# Patient Record
Sex: MALE | Race: BLACK OR AFRICAN AMERICAN | Employment: UNEMPLOYED | URBAN - METROPOLITAN AREA
[De-identification: names, ages, dates, MRNs, and addresses within clinical notes are randomized per-mention and may not be internally consistent; named-entity substitution may affect disease eponyms.]

---

## 2018-09-10 ENCOUNTER — APPOINTMENT (OUTPATIENT)
Dept: CT IMAGING | Age: 48
End: 2018-09-10
Attending: PHYSICIAN ASSISTANT
Payer: SELF-PAY

## 2018-09-10 ENCOUNTER — HOSPITAL ENCOUNTER (EMERGENCY)
Age: 48
Discharge: HOME | End: 2018-09-10
Attending: EMERGENCY MEDICINE
Payer: SELF-PAY

## 2018-09-10 VITALS
RESPIRATION RATE: 18 BRPM | SYSTOLIC BLOOD PRESSURE: 123 MMHG | HEIGHT: 74 IN | TEMPERATURE: 98.5 F | HEART RATE: 76 BPM | DIASTOLIC BLOOD PRESSURE: 79 MMHG | OXYGEN SATURATION: 98 % | BODY MASS INDEX: 27.59 KG/M2 | WEIGHT: 215 LBS

## 2018-09-10 DIAGNOSIS — M54.31 SCIATICA, RIGHT SIDE: Primary | ICD-10-CM
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
## 2018-09-10 PROCEDURE — 74011250636 HC RX REV CODE- 250/636: Performed by: PHYSICIAN ASSISTANT

## 2018-09-10 PROCEDURE — 99283 EMERGENCY DEPT VISIT LOW MDM: CPT

## 2018-09-10 PROCEDURE — 74011250637 HC RX REV CODE- 250/637: Performed by: PHYSICIAN ASSISTANT

## 2018-09-10 PROCEDURE — 96372 THER/PROPH/DIAG INJ SC/IM: CPT

## 2018-09-10 PROCEDURE — 72131 CT LUMBAR SPINE W/O DYE: CPT

## 2018-09-10 PROCEDURE — 74011636637 HC RX REV CODE- 636/637: Performed by: PHYSICIAN ASSISTANT

## 2018-09-10 RX ORDER — TIZANIDINE 4 MG/1
TABLET ORAL
Qty: 15 TAB | Refills: 0 | Status: SHIPPED | OUTPATIENT
Start: 2018-09-10 | End: 2019-04-30

## 2018-09-10 RX ORDER — KETOROLAC TROMETHAMINE 10 MG/1
10 TABLET, FILM COATED ORAL
Qty: 10 TAB | Refills: 0 | Status: SHIPPED | OUTPATIENT
Start: 2018-09-10 | End: 2019-04-30

## 2018-09-10 RX ORDER — PREDNISONE 20 MG/1
60 TABLET ORAL
Status: COMPLETED | OUTPATIENT
Start: 2018-09-10 | End: 2018-09-10

## 2018-09-10 RX ORDER — KETOROLAC TROMETHAMINE 30 MG/ML
15 INJECTION, SOLUTION INTRAMUSCULAR; INTRAVENOUS
Status: COMPLETED | OUTPATIENT
Start: 2018-09-10 | End: 2018-09-10

## 2018-09-10 RX ORDER — TIZANIDINE 4 MG/1
4 TABLET ORAL 4 TIMES DAILY
Status: DISCONTINUED | OUTPATIENT
Start: 2018-09-10 | End: 2018-09-11 | Stop reason: HOSPADM

## 2018-09-10 RX ADMIN — TIZANIDINE 4 MG: 4 TABLET ORAL at 22:07

## 2018-09-10 RX ADMIN — PREDNISONE 60 MG: 20 TABLET ORAL at 21:31

## 2018-09-10 RX ADMIN — KETOROLAC TROMETHAMINE 15 MG: 30 INJECTION INTRAMUSCULAR; INTRAVENOUS at 21:31
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
## 2018-09-11 NOTE — ED NOTES
Will Abdi at bedside reviewing patient's discharge instructions and reviewing medications. Patient ambulatory home with self. Patient in no apparent distress.
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
## 2018-10-02 ENCOUNTER — HOSPITAL ENCOUNTER (OUTPATIENT)
Dept: PHYSICAL THERAPY | Age: 48
Discharge: HOME | End: 2018-10-02
Payer: SELF-PAY

## 2018-10-02 PROCEDURE — 97110 THERAPEUTIC EXERCISES: CPT | Performed by: PHYSICAL THERAPIST

## 2018-10-02 PROCEDURE — 97161 PT EVAL LOW COMPLEX 20 MIN: CPT | Performed by: PHYSICAL THERAPIST
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
## 2018-10-12 ENCOUNTER — HOSPITAL ENCOUNTER (OUTPATIENT)
Dept: PHYSICAL THERAPY | Age: 48
Discharge: HOME | End: 2018-10-12
Payer: SELF-PAY
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
## 2018-12-04 NOTE — PROGRESS NOTES
[facility]  [address], [address] [facility]    OUTPATIENT physical Therapy discharge note      12/4/2018:  Patient will be discharged from physical therapy at this time. Criteria for termination of care:    [x]        Patient has not returned to therapy  []        Patient has missed three or more visits without prior notification  []        Other:     Patient has been seen for initial eval only on 10/2/2018. Please refer to that document for any pertinent PT info available. If you need anything further faxed to you, please contact us at [phone].     Thank you for this referral.  Vangie Fernández, PT

## 2019-04-30 ENCOUNTER — APPOINTMENT (OUTPATIENT)
Dept: CT IMAGING | Age: 49
End: 2019-04-30
Attending: EMERGENCY MEDICINE
Payer: SELF-PAY

## 2019-04-30 ENCOUNTER — HOSPITAL ENCOUNTER (EMERGENCY)
Age: 49
Discharge: HOME | End: 2019-04-30
Attending: EMERGENCY MEDICINE
Payer: SELF-PAY

## 2019-04-30 VITALS
OXYGEN SATURATION: 95 % | HEIGHT: 75 IN | DIASTOLIC BLOOD PRESSURE: 77 MMHG | HEART RATE: 69 BPM | RESPIRATION RATE: 13 BRPM | TEMPERATURE: 98.3 F | BODY MASS INDEX: 26.73 KG/M2 | SYSTOLIC BLOOD PRESSURE: 119 MMHG | WEIGHT: 215 LBS

## 2019-04-30 DIAGNOSIS — M62.838 MUSCLE SPASM: ICD-10-CM

## 2019-04-30 DIAGNOSIS — R20.2 FACIAL PARESTHESIA: Primary | ICD-10-CM

## 2019-04-30 LAB
ALBUMIN SERPL-MCNC: 3.5 G/DL (ref 3.5–5)
ALBUMIN/GLOB SERPL: 0.9 {RATIO} (ref 1.1–2.2)
ALP SERPL-CCNC: 80 U/L (ref 45–117)
ALT SERPL-CCNC: 24 U/L (ref 12–78)
ANION GAP SERPL CALC-SCNC: 6 MMOL/L (ref 5–15)
AST SERPL-CCNC: 18 U/L (ref 15–37)
BASOPHILS # BLD: 0 K/UL (ref 0–0.1)
BASOPHILS NFR BLD: 1 % (ref 0–1)
BILIRUB SERPL-MCNC: 0.5 MG/DL (ref 0.2–1)
BUN SERPL-MCNC: 11 MG/DL (ref 6–20)
BUN/CREAT SERPL: 15 (ref 12–20)
CALCIUM SERPL-MCNC: 9.4 MG/DL (ref 8.5–10.1)
CHLORIDE SERPL-SCNC: 105 MMOL/L (ref 97–108)
CO2 SERPL-SCNC: 29 MMOL/L (ref 21–32)
CREAT SERPL-MCNC: 0.73 MG/DL (ref 0.7–1.3)
DIFFERENTIAL METHOD BLD: ABNORMAL
EOSINOPHIL # BLD: 0.3 K/UL (ref 0–0.4)
EOSINOPHIL NFR BLD: 6 % (ref 0–7)
ERYTHROCYTE [DISTWIDTH] IN BLOOD BY AUTOMATED COUNT: 14.8 % (ref 11.5–14.5)
GLOBULIN SER CALC-MCNC: 3.7 G/DL (ref 2–4)
GLUCOSE SERPL-MCNC: 101 MG/DL (ref 65–100)
HCT VFR BLD AUTO: 36.8 % (ref 36.6–50.3)
HGB BLD-MCNC: 11.8 G/DL (ref 12.1–17)
IMM GRANULOCYTES # BLD AUTO: 0 K/UL (ref 0–0.04)
IMM GRANULOCYTES NFR BLD AUTO: 0 % (ref 0–0.5)
LYMPHOCYTES # BLD: 2 K/UL (ref 0.8–3.5)
LYMPHOCYTES NFR BLD: 35 % (ref 12–49)
MCH RBC QN AUTO: 24.1 PG (ref 26–34)
MCHC RBC AUTO-ENTMCNC: 32.1 G/DL (ref 30–36.5)
MCV RBC AUTO: 75.1 FL (ref 80–99)
MONOCYTES # BLD: 0.8 K/UL (ref 0–1)
MONOCYTES NFR BLD: 13 % (ref 5–13)
NEUTS SEG # BLD: 2.7 K/UL (ref 1.8–8)
NEUTS SEG NFR BLD: 45 % (ref 32–75)
NRBC # BLD: 0 K/UL (ref 0–0.01)
NRBC BLD-RTO: 0 PER 100 WBC
PLATELET # BLD AUTO: 198 K/UL (ref 150–400)
PMV BLD AUTO: 10.5 FL (ref 8.9–12.9)
POTASSIUM SERPL-SCNC: 3.8 MMOL/L (ref 3.5–5.1)
PROT SERPL-MCNC: 7.2 G/DL (ref 6.4–8.2)
RBC # BLD AUTO: 4.9 M/UL (ref 4.1–5.7)
SODIUM SERPL-SCNC: 140 MMOL/L (ref 136–145)
WBC # BLD AUTO: 5.9 K/UL (ref 4.1–11.1)

## 2019-04-30 PROCEDURE — 99283 EMERGENCY DEPT VISIT LOW MDM: CPT

## 2019-04-30 PROCEDURE — 85025 COMPLETE CBC W/AUTO DIFF WBC: CPT

## 2019-04-30 PROCEDURE — 70450 CT HEAD/BRAIN W/O DYE: CPT

## 2019-04-30 PROCEDURE — 93005 ELECTROCARDIOGRAM TRACING: CPT

## 2019-04-30 PROCEDURE — 80053 COMPREHEN METABOLIC PANEL: CPT

## 2019-04-30 PROCEDURE — 36415 COLL VENOUS BLD VENIPUNCTURE: CPT

## 2019-04-30 PROCEDURE — 74011250637 HC RX REV CODE- 250/637: Performed by: EMERGENCY MEDICINE

## 2019-04-30 RX ORDER — ASPIRIN 81 MG/1
81 TABLET ORAL DAILY
Qty: 30 TAB | Refills: 0 | Status: SHIPPED | OUTPATIENT
Start: 2019-04-30 | End: 2020-05-10

## 2019-04-30 RX ORDER — CYCLOBENZAPRINE HCL 10 MG
10 TABLET ORAL
Status: COMPLETED | OUTPATIENT
Start: 2019-04-30 | End: 2019-04-30

## 2019-04-30 RX ORDER — CYCLOBENZAPRINE HCL 10 MG
10 TABLET ORAL
Qty: 10 TAB | Refills: 0 | Status: SHIPPED | OUTPATIENT
Start: 2019-04-30 | End: 2020-05-10

## 2019-04-30 RX ORDER — GUAIFENESIN 100 MG/5ML
325 LIQUID (ML) ORAL
Status: COMPLETED | OUTPATIENT
Start: 2019-04-30 | End: 2019-04-30

## 2019-04-30 RX ADMIN — CYCLOBENZAPRINE HYDROCHLORIDE 10 MG: 10 TABLET, FILM COATED ORAL at 21:50

## 2019-04-30 RX ADMIN — CETIRIZINE HYDROCHLORIDE, PSEUDOEPHEDRINE HYDROCHLORIDE 324 MG: 5; 120 TABLET, FILM COATED, EXTENDED RELEASE ORAL at 21:51

## 2019-05-01 LAB
ATRIAL RATE: 68 BPM
CALCULATED P AXIS, ECG09: 57 DEGREES
CALCULATED R AXIS, ECG10: 45 DEGREES
CALCULATED T AXIS, ECG11: 28 DEGREES
DIAGNOSIS, 93000: NORMAL
P-R INTERVAL, ECG05: 146 MS
Q-T INTERVAL, ECG07: 380 MS
QRS DURATION, ECG06: 90 MS
QTC CALCULATION (BEZET), ECG08: 404 MS
VENTRICULAR RATE, ECG03: 68 BPM

## 2019-05-01 NOTE — ED NOTES
Pt arrived to ED  with c/o weakness above left eye for 45 minutes. Denies cp or dyspnea. Pt reports smoking aand use of heroin 5 hours. Pt is in no acute distress. Will continue to monitor. See nursing assessment. Safety precautions in place; call light within reach. Emergency Department Nursing Plan of Care The Nursing Plan of Care is developed from the Nursing assessment and Emergency Department Attending provider initial evaluation. The plan of care may be reviewed in the ED Provider note. The Plan of Care was developed with the following considerations:  
Patient / Family readiness to learn indicated by:verbalized understanding Persons(s) to be included in education: patient Barriers to Learning/Limitations:No 
 
Signed Kathie Edwards RN   
4/30/2019   9:01 PM

## 2019-05-01 NOTE — ED TRIAGE NOTES
Pt reports L eye numbness X 20 min PTA. Pt did not have an asymmetrical smile in triage. Pt also reports L leg pain from hip to calf X 1 week. Pt denies any injury.

## 2019-05-01 NOTE — ED NOTES
Patient (s)  given copy of dc instructions and 2 script(s). Patient (s)  verbalized understanding of instructions and script (s). Patient given a current medication reconciliation form and verbalized understanding of their medications. Patient (s) verbalized understanding of the importance of discussing medications with  his or her physician or clinic they will be following up with. Patient alert and oriented and in no acute distress. Patient discharged home ambulatory.

## 2019-05-01 NOTE — DISCHARGE INSTRUCTIONS
Patient Education        Muscle Cramps: Care Instructions  Your Care Instructions    A muscle cramp occurs when a muscle tightens up suddenly. A cramp often happens in the legs. A muscle cramp is also called a muscle spasm or a charley horse. Muscle cramps usually last less than a minute. However, the pain may last for several minutes. Leg cramps that occur at night may wake you up. Heavy exercise, dehydration, and being overweight can increase your risk of getting cramps. An imbalance of certain chemicals in your blood, called electrolytes, can also lead to muscle cramps. Pregnant women sometimes get muscle cramps during sleep. Muscle cramps can be treated by stretching and massaging the muscle. If cramps keep coming back, your doctor may prescribe medicine that relaxes your muscles. Follow-up care is a key part of your treatment and safety. Be sure to make and go to all appointments, and call your doctor if you are having problems. It's also a good idea to know your test results and keep a list of the medicines you take. How can you care for yourself at home? · Drink plenty of fluids to prevent dehydration. Choose water and other caffeine-free clear liquids until you feel better. If you have kidney, heart, or liver disease and have to limit fluids, talk with your doctor before you increase the amount of fluids you drink. · Stretch your muscles every day, especially before and after exercise and at bedtime. Regular stretching can relax your muscles and may prevent cramps. · Do not suddenly increase the amount of exercise you get. Increase your exercise a little each week. · When you get a cramp, stretch and massage the muscle. You can also take a warm shower or bath to relax the muscle. A heating pad placed on the muscle can also help. · Take a daily multivitamin supplement.   · Ask your doctor if you can take an over-the-counter pain medicine, such as acetaminophen (Tylenol), ibuprofen (Advil, Motrin), or naproxen (Aleve). Be safe with medicines. Read and follow all instructions on the label. When should you call for help? Watch closely for changes in your health, and be sure to contact your doctor if:    · You get muscle cramps often that do not go away after home treatment.     · Your muscle cramps often wake you up at night.     · You do not get better as expected. Where can you learn more? Go to http://andres-melany.info/. Enter D519 in the search box to learn more about \"Muscle Cramps: Care Instructions. \"  Current as of: September 20, 2018  Content Version: 11.9  © 0127-0393 [employer]. Care instructions adapted under license by [employer] (which disclaims liability or warranty for this information). If you have questions about a medical condition or this instruction, always ask your healthcare professional. [place] [address] any warranty or liability for your use of this information. Patient Education        Numbness and Tingling: Care Instructions  Your Care Instructions    Many things can cause numbness or tingling. Swelling may put pressure on a nerve. This could cause you to lose feeling or have a pins-and-needles sensation on part of your body. Nerves may be damaged from trauma, toxins, or diseases, such as diabetes or multiple sclerosis (MS). Sometimes, though, the cause is not clear. If there is no clear reason for your symptoms, and you are not having any other symptoms, your doctor may suggest watching and waiting for a while to see if the numbness or tingling goes away on its own. Your doctor may want you to have blood or nerve tests to find the cause of your symptoms. Follow-up care is a key part of your treatment and safety. Be sure to make and go to all appointments, and call your doctor if you are having problems. It's also a good idea to know your test results and keep a list of the medicines you take.   How can you care for yourself at home? · If your doctor prescribes medicine, take it exactly as directed. Call your doctor if you think you are having a problem with your medicine. · If you have any swelling, put ice or a cold pack on the area for 10 to 20 minutes at a time. Put a thin cloth between the ice and your skin. When should you call for help? Call 911 anytime you think you may need emergency care. For example, call if:    · You have weakness, numbness, or tingling in both legs.     · You lose bowel or bladder control.     · You have symptoms of a stroke. These may include:  ? Sudden numbness, tingling, weakness, or loss of movement in your face, arm, or leg, especially on only one side of your body. ? Sudden vision changes. ? Sudden trouble speaking. ? Sudden confusion or trouble understanding simple statements. ? Sudden problems with walking or balance. ? A sudden, severe headache that is different from past headaches.    Watch closely for changes in your health, and be sure to contact your doctor if you have any problems, or if:    · You do not get better as expected. Where can you learn more? Go to http://andres-melany.info/. Enter S885 in the search box to learn more about \"Numbness and Tingling: Care Instructions. \"  Current as of: Dipika 3, 2018  Content Version: 11.9  © 6499-0047 [employer]. Care instructions adapted under license by [employer] (which disclaims liability or warranty for this information). If you have questions about a medical condition or this instruction, always ask your healthcare professional. [place] [address] any warranty or liability for your use of this information.

## 2019-05-01 NOTE — ED PROVIDER NOTES
EMERGENCY DEPARTMENT HISTORY AND PHYSICAL EXAM 
 
 
Date: 4/30/2019 Patient Name: Mark Bruno History of Presenting Illness Chief Complaint Patient presents with  Numbness  Leg Pain History Provided By: Patient HPI: Mark Bruno, 51 y.o. male with PMHx significant for nothing who presents via private vehicle to the ED with cc of numbness in his left eyebrow. Patient states is been in his normal state of health, at about 30 minutes ago he went to take half off that he was wearing and noticed that right around his left eyebrow felt numb. He denies any numbness in the rest of his face or that that numbness extends around his scalp it is isolated to the left eyebrow region. His vision is fine. He denies any weakness, trouble speaking, numbness or tingling anywhere else in his body. He has normal gait. He has had a muscle spasm in his left thigh for the last for 5 days but that is improving. He did use heroin about 5 hours ago. PMHx: None PSHx: Left finger Social Hx: No EtOH; yes smoker; heroin illicit Drugs PCP: None There are no other complaints, changes, or physical findings at this time. Current Outpatient Medications Medication Sig Dispense Refill  ketorolac (TORADOL) 10 mg tablet Take 1 Tab by mouth every six (6) hours as needed for Pain. 10 Tab 0  
 tiZANidine (ZANAFLEX) 4 mg tablet QID 15 Tab 0  
 loratadine (CLARITIN) 10 mg tablet Take 1 Tab by mouth daily. Prn ear congestion, pain 14 Tab 0  
 HYDROcodone-acetaminophen (NORCO) 7.5-325 mg per tablet Take 1 Tab by mouth every six (6) hours as needed for Pain. 20 Tab 0 Past History Past Medical History: 
Past Medical History:  
Diagnosis Date  Other ill-defined conditions(360.89)   
 hx of back pain Past Surgical History: 
Past Surgical History:  
Procedure Laterality Date  HX OTHER SURGICAL 3rd finger of left hand Family History: History reviewed. No pertinent family history. Social History: 
Social History Tobacco Use  Smoking status: Current Every Day Smoker  Smokeless tobacco: Current User Substance Use Topics  Alcohol use: No  
 Drug use: Yes Frequency: 7.0 times per week Types: Marijuana, Heroin Comment: used 5 hours ago Allergies: Allergies Allergen Reactions  Penicillins Anaphylaxis Review of Systems Review of Systems Constitutional: Negative for activity change, diaphoresis, fatigue and fever. HENT: Negative for ear pain, facial swelling, hearing loss, nosebleeds, postnasal drip, rhinorrhea, sinus pressure, sinus pain, sneezing, sore throat, tinnitus and trouble swallowing. Eyes: Negative for photophobia, pain and visual disturbance. Respiratory: Negative for cough and shortness of breath. Cardiovascular: Negative for chest pain, palpitations and leg swelling. Gastrointestinal: Negative for abdominal pain, diarrhea, nausea and vomiting. Musculoskeletal: Negative for back pain, myalgias, neck pain and neck stiffness. Neurological: Positive for numbness. Negative for dizziness, tremors, seizures, syncope, facial asymmetry, speech difficulty, weakness, light-headedness and headaches. Psychiatric/Behavioral: Negative for agitation. All other systems reviewed and are negative. Physical Exam  
Physical Exam  
Constitutional: He is oriented to person, place, and time. He appears well-developed and well-nourished. No distress. HENT:  
Head: Normocephalic and atraumatic. Mouth/Throat: Oropharynx is clear and moist.  
Eyes: Pupils are equal, round, and reactive to light. Conjunctivae and EOM are normal.  
Neck: Normal range of motion. Neck supple. Cardiovascular: Normal rate, regular rhythm, normal heart sounds and intact distal pulses. Pulmonary/Chest: Effort normal and breath sounds normal. He has no wheezes. Abdominal: Soft.  Bowel sounds are normal.  
 Musculoskeletal: Normal range of motion. He exhibits tenderness (mild L mid quadricep). He exhibits no edema. Neurological: He is alert and oriented to person, place, and time. He exhibits normal muscle tone. Coordination normal.  
Subjective decrease in sensation directly underneath the left eyebrow. No specific dermatome. No facial asymmetry. Cranial nerves intact and symmetric Skin: Skin is warm and dry. He is not diaphoretic. Psychiatric: He has a normal mood and affect. Nursing note and vitals reviewed. Diagnostic Study Results Labs - Recent Results (from the past 12 hour(s)) EKG, 12 LEAD, INITIAL Collection Time: 04/30/19  8:39 PM  
Result Value Ref Range Ventricular Rate 68 BPM  
 Atrial Rate 68 BPM  
 P-R Interval 146 ms  
 QRS Duration 90 ms Q-T Interval 380 ms QTC Calculation (Bezet) 404 ms Calculated P Axis 57 degrees Calculated R Axis 45 degrees Calculated T Axis 28 degrees Diagnosis Sinus rhythm with premature supraventricular complexes Nonspecific T wave abnormality Abnormal ECG No previous ECGs available CBC WITH AUTOMATED DIFF Collection Time: 04/30/19  8:51 PM  
Result Value Ref Range WBC 5.9 4.1 - 11.1 K/uL  
 RBC 4.90 4. 10 - 5.70 M/uL  
 HGB 11.8 (L) 12.1 - 17.0 g/dL HCT 36.8 36.6 - 50.3 % MCV 75.1 (L) 80.0 - 99.0 FL  
 MCH 24.1 (L) 26.0 - 34.0 PG  
 MCHC 32.1 30.0 - 36.5 g/dL  
 RDW 14.8 (H) 11.5 - 14.5 % PLATELET 516 060 - 790 K/uL MPV 10.5 8.9 - 12.9 FL  
 NRBC 0.0 0  WBC ABSOLUTE NRBC 0.00 0.00 - 0.01 K/uL NEUTROPHILS 45 32 - 75 % LYMPHOCYTES 35 12 - 49 % MONOCYTES 13 5 - 13 % EOSINOPHILS 6 0 - 7 % BASOPHILS 1 0 - 1 % IMMATURE GRANULOCYTES 0 0.0 - 0.5 % ABS. NEUTROPHILS 2.7 1.8 - 8.0 K/UL  
 ABS. LYMPHOCYTES 2.0 0.8 - 3.5 K/UL  
 ABS. MONOCYTES 0.8 0.0 - 1.0 K/UL  
 ABS. EOSINOPHILS 0.3 0.0 - 0.4 K/UL  
 ABS. BASOPHILS 0.0 0.0 - 0.1 K/UL  
 ABS. IMM. GRANS. 0.0 0.00 - 0.04 K/UL DF AUTOMATED Radiologic Studies -  
CT HEAD WO CONT    (Results Pending) No results found. Medical Decision Making I am the first provider for this patient. I reviewed the vital signs, available nursing notes, past medical history, past surgical history, family history and social history. Vital Signs-Reviewed the patient's vital signs. Patient Vitals for the past 12 hrs: 
 Temp Pulse Resp BP SpO2  
04/30/19 2009 98.3 °F (36.8 °C) 85 14 142/84 98 % Pulse Oximetry Analysis -98 % on RA Cardiac Monitor:  
Rate: 85 bpm 
Rhythm: Normal Sinus Rhythm ED EKG interpretation: 
Rhythm: normal sinus rhythm; and regular . Rate (approx.): 68; Axis: normal; P wave: normal; QRS interval: normal ; ST/T wave: non-specific changes; Other findings: sinus with premature SV complexes. This EKG was interpreted by Joey Everett MD,ED Provider. Records Reviewed: Nursing Notes and Old Medical Records Provider Notes (Medical Decision Making): CVA, TIA, paresthesia, intracranial mass, trigeminal neuralgia, electrolyte abnormality, muscle spasm of left quad, muscle strain ED Course:  
Initial assessment performed. The patients presenting problems have been discussed, and they are in agreement with the care plan formulated and outlined with them. I have encouraged them to ask questions as they arise throughout their visit. Progress Note:  
 
Updated pt on all returned results and findings. Discussed the importance of proper follow up as referred below along with return precautions. Pt in agreement with the care plan and expresses agreement with and understanding of all items discussed. Disposition: 
Discharge Note: The patient has been re-evaluated and is ready for discharge. Reviewed available results with patient. Counseled patient on diagnosis and care plan.  Patient has expressed understanding, and all questions have been answered. Patient agrees with plan and agrees to follow up as recommended, or to return to the ED if their symptoms worsen. Discharge instructions have been provided and explained to the patient, along with reasons to return to the ED. PLAN: 
1. Current Discharge Medication List  
  
START taking these medications Details  
aspirin delayed-release 81 mg tablet Take 1 Tab by mouth daily. Qty: 30 Tab, Refills: 0  
  
cyclobenzaprine (FLEXERIL) 10 mg tablet Take 1 Tab by mouth three (3) times daily as needed for Muscle Spasm(s). Qty: 10 Tab, Refills: 0 STOP taking these medications  
  
 ketorolac (TORADOL) 10 mg tablet Comments:  
Reason for Stopping:   
   
 tiZANidine (ZANAFLEX) 4 mg tablet Comments:  
Reason for Stopping:   
   
 loratadine (CLARITIN) 10 mg tablet Comments:  
Reason for Stopping:   
   
 HYDROcodone-acetaminophen (NORCO) 7.5-325 mg per tablet Comments:  
Reason for Stoppin.  
Follow-up Information Follow up With Specialties Details Why Contact Info [facility]  In 2 days  [address] [facility], [address] [place] [id number] [phone] [facility] EMERGENCY DEPT Emergency Medicine  If symptoms worsen, As needed [address] Return to ED if worse Diagnosis Clinical Impression: 1. Facial paresthesia 2. Muscle spasm

## 2019-10-19 ENCOUNTER — HOSPITAL ENCOUNTER (EMERGENCY)
Age: 49
Discharge: HOME | End: 2019-10-19
Attending: EMERGENCY MEDICINE
Payer: MEDICAID

## 2019-10-19 VITALS
RESPIRATION RATE: 15 BRPM | OXYGEN SATURATION: 100 % | DIASTOLIC BLOOD PRESSURE: 93 MMHG | HEIGHT: 75 IN | HEART RATE: 62 BPM | TEMPERATURE: 98.3 F | BODY MASS INDEX: 25.49 KG/M2 | SYSTOLIC BLOOD PRESSURE: 143 MMHG | WEIGHT: 205 LBS

## 2019-10-19 DIAGNOSIS — K02.9 DENTAL CARIES: Primary | ICD-10-CM

## 2019-10-19 DIAGNOSIS — K08.89 DENTALGIA: ICD-10-CM

## 2019-10-19 PROCEDURE — 74011000250 HC RX REV CODE- 250: Performed by: NURSE PRACTITIONER

## 2019-10-19 PROCEDURE — 99283 EMERGENCY DEPT VISIT LOW MDM: CPT

## 2019-10-19 PROCEDURE — 74011250637 HC RX REV CODE- 250/637: Performed by: NURSE PRACTITIONER

## 2019-10-19 RX ORDER — CLOPIDOGREL BISULFATE 75 MG/1
TABLET ORAL
COMMUNITY
Start: 2019-10-01 | End: 2020-01-12

## 2019-10-19 RX ORDER — METOPROLOL TARTRATE 25 MG/1
TABLET, FILM COATED ORAL
Refills: 3 | COMMUNITY
Start: 2019-10-09 | End: 2021-11-19

## 2019-10-19 RX ORDER — GUAIFENESIN 100 MG/5ML
LIQUID (ML) ORAL
COMMUNITY
Start: 2019-10-01 | End: 2020-09-25

## 2019-10-19 RX ORDER — CLINDAMYCIN HYDROCHLORIDE 300 MG/1
300 CAPSULE ORAL 2 TIMES DAILY
Qty: 14 CAP | Refills: 0 | Status: SHIPPED | OUTPATIENT
Start: 2019-10-19 | End: 2019-10-26

## 2019-10-19 RX ORDER — ACETAMINOPHEN 500 MG
1000 TABLET ORAL
Status: COMPLETED | OUTPATIENT
Start: 2019-10-19 | End: 2019-10-19

## 2019-10-19 RX ORDER — LISINOPRIL 5 MG/1
TABLET ORAL
COMMUNITY
Start: 2019-10-01 | End: 2020-01-12

## 2019-10-19 RX ORDER — ATORVASTATIN CALCIUM 80 MG/1
TABLET, FILM COATED ORAL
COMMUNITY
Start: 2019-10-01 | End: 2020-01-12

## 2019-10-19 RX ORDER — BUPRENORPHINE HYDROCHLORIDE AND NALOXONE HYDROCHLORIDE DIHYDRATE 8; 2 MG/1; MG/1
TABLET SUBLINGUAL
Refills: 0 | COMMUNITY
Start: 2019-10-08 | End: 2020-09-07 | Stop reason: ALTCHOICE

## 2019-10-19 RX ORDER — ACETAMINOPHEN 500 MG
1000 TABLET ORAL
Qty: 20 TAB | Refills: 0 | Status: SHIPPED | OUTPATIENT
Start: 2019-10-19 | End: 2020-05-10

## 2019-10-19 RX ORDER — CARVEDILOL 3.12 MG/1
TABLET ORAL
Refills: 11 | COMMUNITY
Start: 2019-10-02

## 2019-10-19 RX ORDER — NITROGLYCERIN 0.4 MG/1
TABLET SUBLINGUAL
Refills: 3 | COMMUNITY
Start: 2019-10-09

## 2019-10-19 RX ORDER — PANTOPRAZOLE SODIUM 40 MG/1
TABLET, DELAYED RELEASE ORAL
COMMUNITY
Start: 2019-10-01 | End: 2020-01-12

## 2019-10-19 RX ADMIN — ACETAMINOPHEN 1000 MG: 500 TABLET, FILM COATED ORAL at 11:16

## 2019-10-19 RX ADMIN — DIPHENHYDRAMINE HYDROCHLORIDE: 12.5 LIQUID ORAL at 11:16

## 2019-10-19 NOTE — ED NOTES
Emergency Department Nursing Plan of Care       The Nursing Plan of Care is developed from the Nursing assessment and Emergency Department Attending provider initial evaluation. The plan of care may be reviewed in the ED Provider note. The Plan of Care was developed with the following considerations:   Patient / Family readiness to learn indicated by:verbalized understanding  Persons(s) to be included in education: patient  Barriers to Learning/Limitations:No    Signed     Wesley Liming    10/19/2019   10:28 AM    Patient is alert and oriented x 4 and in no acute distress at this time. Respirations are at a regular rate, depth, and pattern. Patient updated on plan of care and has no questions or concerns at this time. Call bell within reach. Will continue to monitor. Please reference nursing assessment.

## 2019-10-19 NOTE — DISCHARGE INSTRUCTIONS
Patient Education        Periodontal Conditions: Care Instructions  Your Care Instructions    Periodontal conditions affect the gums, bone, and tissue that surround and support the teeth. The most common problems are caused by plaque. Plaque is a thin film of bacteria that sticks to teeth above and below the gum line. It can build up and harden into tartar. The bacteria in plaque and tartar can cause gum disease. Gingivitis is a disease that affects the gums (gingiva). The gums are the soft tissue that surrounds the teeth. Gingivitis causes red, swollen, tender gums that bleed easily when brushed, persistent bad breath, and sensitive teeth. Because it is not painful, many people do not get treatment when they should. Gingivitis can be reversed with good dental care. Periodontitis is a more advanced disease that affects more than the gums. The gums pull away from the teeth. This leaves deep pockets where bacteria can grow. The disease can damage the bones that support the teeth. The teeth may get loose and fall out. A periodontal condition should be treated as soon as it is found. Finding gum problems early, treating them right away, and having regular checkups bring the best results. You can treat mild periodontal conditions by brushing and flossing your teeth every day. Your dentist may prescribe a mouthwash to kill the bacteria that can damage teeth and gums. Your dentist may have you take antibiotics to treat infection from moderate periodontal disease. If your gums have pulled away from your teeth, you may need cleaning between the teeth and gums right down to the teeth roots. This is called root planing and scaling. If you have severe periodontal disease, you may need surgery to remove diseased gum tissue or repair bone damage. Follow-up care is a key part of your treatment and safety. Be sure to make and go to all appointments, and call your dentist if you are having problems.  It's also a good idea to know your test results and keep a list of the medicines you take. How can you care for yourself at home? · If your dentist prescribed antibiotics, take them as directed. Do not stop taking them just because you feel better. You need to take the full course of antibiotics. · Brush your teeth twice a day, in the morning and at night. ? Use a toothbrush with soft, rounded-end bristles and a head that is small enough to reach all parts of your teeth and mouth. Replace your toothbrush every 3 to 4 months. ? Use a fluoride toothpaste. ? Place the brush at a 45-degree angle where the teeth meet the gums. Press firmly, and gently rock the brush back and forth using small circular movements. ? Brush chewing surfaces vigorously with short back-and-forth strokes. ? Brush your tongue from back to front. · Floss at least once a day. Choose the type and flavor that you like best.  · Have your teeth cleaned by a professional at least twice a year. · Ask your dentist about using an antibacterial mouthwash to help reduce bacteria. · Rinse your mouth with water or chew sugar-free gum after meals if you can't brush your teeth. · Do not smoke or use smokeless tobacco. Tobacco use can cause periodontal disease. When should you call for help? Call your dentist now or seek immediate medical care if:    · You have symptoms of infection, such as:  ? Increased pain, swelling, warmth, or redness. ? Red streaks leading from the area. ? Pus draining from the area. ? A fever.    Watch closely for changes in your health, and be sure to contact your dentist if:    · You have new or worse tooth pain.     · You do not get better as expected. Where can you learn more? Go to http://andres-melany.info/. Enter Q550 in the search box to learn more about \"Periodontal Conditions: Care Instructions. \"  Current as of: October 3, 2018  Content Version: 12.2  © 5128-3910 [employer].  Care instructions adapted under license by [employer] (which disclaims liability or warranty for this information). If you have questions about a medical condition or this instruction, always ask your healthcare professional. [address] any warranty or liability for your use of this information.

## 2019-10-19 NOTE — ED PROVIDER NOTES
EMERGENCY DEPARTMENT HISTORY AND PHYSICAL EXAM    Date: 10/19/2019  Patient Name: Merlin Lefort    History of Presenting Illness     Chief Complaint   Patient presents with    Dental Swelling     dental abscess leading to facial swelling         History Provided By: Patient      HPI: Merlin Lefort is a 47 y.o. male with a PMH of myocardial infarction who presents with dental pain. Onset less than 1 week ago. Mainly located in upper gums but states he has multiple fractured teeth all over. Reports waking up today noticing facial swelling. Denies fever, chills. States he does not have a dentist at this time. Last fractured tooth noted in the right upper gum 6 months ago. Has not followed up since it was fractured. He is taking BC powder for pain. PCP: None    Current Facility-Administered Medications   Medication Dose Route Frequency Provider Last Rate Last Dose    dental ball (lidocaine/Benadryl/Cetacaine) mixture   Mucous Membrane ONCE Jorge Hoover NP        acetaminophen (TYLENOL) tablet 1,000 mg  1,000 mg Oral NOW Jorge Hoover NP         Current Outpatient Medications   Medication Sig Dispense Refill    pantoprazole (PROTONIX) 40 mg tablet 40 mg = 1 tab each dose, PO, ac breakfast, # 30 tab, 2 Refills, Pharmacy: [facility]      aspirin 81 mg chewable tablet 81 mg, PO, daily, # 30 tab, 11 Refills, Pharmacy: [facility]      clopidogrel (PLAVIX) 75 mg tab 75 mg = 1 tab each dose, PO, daily, # 30 tab, 2 Refills, Pharmacy: [facility]      lisinopril (PRINIVIL, ZESTRIL) 5 mg tablet 5 mg = 1 tab each dose, PO, daily, # 30 tab, 2 Refills, Pharmacy: [facility]      atorvastatin (LIPITOR) 80 mg tablet 80 mg = 1 tab each dose, PO, bedtime, # 30 tab, 2 Refills, Pharmacy: [facility]      clindamycin (CLEOCIN) 300 mg capsule Take 1 Cap by mouth two (2) times a day for 7 days.  14 Cap 0    acetaminophen (TYLENOL EXTRA STRENGTH) 500 mg tablet Take 2 Tabs by mouth every six (6) hours as needed for Pain. 20 Tab 0    metoprolol tartrate (LOPRESSOR) 25 mg tablet TAKE 1/2 TABLET BY MOUTH TWO TIMES A DAY  3    nitroglycerin (NITROSTAT) 0.4 mg SL tablet DISSOLVE 1 TABLET UNDER THE TONGUE AS NEEDED FOR CHEST PAIN, MAY REPEAT EVERY 5 MINUTES FOR 3 TOTAL DOSES. 3    buprenorphine-naloxone 8-2 mg subl PLACE TWO TABLETS UNDER THE TONGUE EVERY DAY FOR 8 DAYS.  0    carvedilol (COREG) 3.125 mg tablet TAKE ONE TABLET BY MOUTH EVERY 12 HOURS  11    aspirin delayed-release 81 mg tablet Take 1 Tab by mouth daily. 30 Tab 0    cyclobenzaprine (FLEXERIL) 10 mg tablet Take 1 Tab by mouth three (3) times daily as needed for Muscle Spasm(s). 10 Tab 0       Past History     Past Medical History:  Past Medical History:   Diagnosis Date    Other ill-defined conditions(153.13)     hx of back pain       Past Surgical History:  Past Surgical History:   Procedure Laterality Date    HX OTHER SURGICAL      3rd finger of left hand       Family History:  History reviewed. No pertinent family history. Social History:  Social History     Tobacco Use    Smoking status: Current Every Day Smoker    Smokeless tobacco: Current User   Substance Use Topics    Alcohol use: No    Drug use: Yes     Frequency: 7.0 times per week     Types: Marijuana, Heroin     Comment: used 5 hours ago       Allergies: Allergies   Allergen Reactions    Penicillins Anaphylaxis         Review of Systems   Review of Systems   Constitutional: Negative for chills and fever. HENT: Positive for dental problem. Negative for congestion, drooling, ear discharge, ear pain, facial swelling, mouth sores, rhinorrhea and sore throat. Eyes: Negative for pain and discharge. Respiratory: Negative for cough and shortness of breath. Cardiovascular: Negative for chest pain and leg swelling. Gastrointestinal: Negative for abdominal pain, nausea and vomiting. Genitourinary: Negative for urgency.    Musculoskeletal: Negative for arthralgias. Skin: Negative for wound. Neurological: Negative for numbness and headaches. All other systems reviewed and are negative. Physical Exam     Vitals:    10/19/19 1006   BP: (!) 143/93   Pulse: 62   Resp: 15   Temp: 98.3 °F (36.8 °C)   SpO2: 100%   Weight: 93 kg (205 lb)   Height: 6' 3\" (1.905 m)     Physical Exam   Constitutional: He is oriented to person, place, and time. He appears well-developed and well-nourished. HENT:   Head: Normocephalic and atraumatic. Right Ear: Tympanic membrane and ear canal normal.   Left Ear: Tympanic membrane and ear canal normal.   Nose: Nose normal.   Mouth/Throat: Oropharynx is clear and moist and mucous membranes are normal. Abnormal dentition. Dental caries present. No dental abscesses. Multiple dental caries and fractured teeth on lower and upper gums. Upper gums with erythema and swelling   Neck: Normal range of motion. Neck supple. Cardiovascular: Normal rate, regular rhythm and normal heart sounds. Pulmonary/Chest: Effort normal and breath sounds normal.   Lymphadenopathy:     He has no cervical adenopathy. Neurological: He is alert and oriented to person, place, and time. Psychiatric: Thought content normal.   Nursing note and vitals reviewed. Diagnostic Study Results     Labs -   No results found for this or any previous visit (from the past 12 hour(s)). Radiologic Studies -   No orders to display     CT Results  (Last 48 hours)    None        CXR Results  (Last 48 hours)    None            Medical Decision Making   I am the first provider for this patient. I reviewed the vital signs, available nursing notes, past medical history, past surgical history, family history and social history. Vital Signs-Reviewed the patient's vital signs. Records Reviewed: Nursing Notes and Old Medical Records            Disposition:  Discharge  DISCHARGE NOTE:   11:01 AM        Care plan outlined and precautions discussed. Patient has no new complaints, changes, or physical findings. All medications were reviewed with the patient; will d/c home with clindamycin and Tylenol. All of pt's questions and concerns were addressed. Patient was instructed and agrees to follow up with [facility], as well as to return to the ED upon further deterioration. Patient is ready to go home. Follow-up Information     Follow up With Specialties Details Why Contact Info        You can also follow up with [facility] [address]  [phone]    [address]  Schedule an appointment as soon as possible for a visit  [address]  [address] [facility]  Schedule an appointment as soon as possible for a visit  [address] [place]  [phone]          Current Discharge Medication List      START taking these medications    Details   clindamycin (CLEOCIN) 300 mg capsule Take 1 Cap by mouth two (2) times a day for 7 days. Qty: 14 Cap, Refills: 0      acetaminophen (TYLENOL EXTRA STRENGTH) 500 mg tablet Take 2 Tabs by mouth every six (6) hours as needed for Pain. Qty: 20 Tab, Refills: 0             Provider Notes (Medical Decision Making):   DDX: Dental Abscess, Dentalgia, Tooth Avulsion, Dental Caries   Procedures:  Procedures    Please note that this dictation was completed with Dragon, computer voice recognition software. Quite often unanticipated grammatical, syntax, homophones, and other interpretive errors are inadvertently transcribed by the computer software. Please disregard these errors. Additionally, please excuse any errors that have escaped final proofreading. Diagnosis     Clinical Impression:   1. Dental caries    2.  Darus Borrow

## 2019-10-19 NOTE — ED NOTES
Discharge instructions were given to the patient by KAMILLE Coppola RN. .     The patient left the Emergency Department ambulatory, alert and oriented and in no acute distress with 2 prescription(s). The patient was encouraged to call or return to the ED for worsening symptoms or problems and was encouraged to schedule a follow up appointment for continuing care. Patient leaving ED accompanied by wife. The patient verbalized understanding of discharge instructions and prescriptions, all questions were answered. The patient has no further concerns at this time. Patient declined wheelchair transfer upon ED discharge.

## 2020-03-07 ENCOUNTER — HOSPITAL ENCOUNTER (EMERGENCY)
Age: 50
Discharge: HOME | End: 2020-03-07
Attending: EMERGENCY MEDICINE
Payer: COMMERCIAL

## 2020-03-07 VITALS
HEIGHT: 75 IN | BODY MASS INDEX: 24.25 KG/M2 | TEMPERATURE: 97.5 F | RESPIRATION RATE: 18 BRPM | SYSTOLIC BLOOD PRESSURE: 123 MMHG | WEIGHT: 195 LBS | HEART RATE: 63 BPM | OXYGEN SATURATION: 95 % | DIASTOLIC BLOOD PRESSURE: 80 MMHG

## 2020-03-07 DIAGNOSIS — K04.7 DENTAL ABSCESS: Primary | ICD-10-CM

## 2020-03-07 PROCEDURE — 99282 EMERGENCY DEPT VISIT SF MDM: CPT

## 2020-03-07 RX ORDER — IBUPROFEN 800 MG/1
800 TABLET ORAL
Qty: 20 TAB | Refills: 0 | Status: SHIPPED | OUTPATIENT
Start: 2020-03-07 | End: 2020-03-14

## 2020-03-07 RX ORDER — CLINDAMYCIN HYDROCHLORIDE 300 MG/1
300 CAPSULE ORAL 4 TIMES DAILY
Qty: 28 CAP | Refills: 0 | Status: SHIPPED | OUTPATIENT
Start: 2020-03-07 | End: 2020-03-14

## 2020-03-07 NOTE — LETTER
[facility] EMERGENCY DEPT 
[address] [address] 
[phone] Work/School Note Date: 3/7/2020 To Whom It May concern: 
 
Swati Houston was seen and treated today in the emergency room by the following provider(s): 
Attending Provider: Nate Saenz MD 
Physician Assistant: Sammie Finch PA-C. Swati Houston may return to work on 3/9/2020. Sincerely, Ashley STOCK

## 2020-03-07 NOTE — ED PROVIDER NOTES
EMERGENCY DEPARTMENT HISTORY AND PHYSICAL EXAM      Date: 3/7/2020  Patient Name: Ryne Ritchie    History of Presenting Illness     Chief Complaint   Patient presents with    Facial Swelling    Dental Pain     History Provided By: Patient    HPI: Ryne Ritchie, 47 y.o. male with penicillin allergy, tobacco abuse who presents ambulatory to the ED with cc of acute moderate left-sided facial swelling X 1 day. Patient versus history of similar symptoms with dental infection in the past.  Patient denies any dental pain at present. No medications or alleviating factors prior to arrival.  Denies fever, chills, nausea, vomiting, tongue swelling, lip swelling, dyspnea, shortness of breath, wheezing, cough, lightheadedness, dizziness, headache, drainage, neck pain or stiffness, chest pain. Patient endorses he has dental follow-up in 2 days. PCP: None    There are no other complaints, changes, or physical findings at this time. No current facility-administered medications on file prior to encounter. Current Outpatient Medications on File Prior to Encounter   Medication Sig Dispense Refill    aspirin 81 mg chewable tablet 81 mg, PO, daily, # 30 tab, 11 Refills, Pharmacy: [facility]      metoprolol tartrate (LOPRESSOR) 25 mg tablet TAKE 1/2 TABLET BY MOUTH TWO TIMES A DAY  3    nitroglycerin (NITROSTAT) 0.4 mg SL tablet DISSOLVE 1 TABLET UNDER THE TONGUE AS NEEDED FOR CHEST PAIN, MAY REPEAT EVERY 5 MINUTES FOR 3 TOTAL DOSES. 3    buprenorphine-naloxone 8-2 mg subl PLACE TWO TABLETS UNDER THE TONGUE EVERY DAY FOR 8 DAYS.  0    carvedilol (COREG) 3.125 mg tablet TAKE ONE TABLET BY MOUTH EVERY 12 HOURS  11    acetaminophen (TYLENOL EXTRA STRENGTH) 500 mg tablet Take 2 Tabs by mouth every six (6) hours as needed for Pain. 20 Tab 0    aspirin delayed-release 81 mg tablet Take 1 Tab by mouth daily.  30 Tab 0    cyclobenzaprine (FLEXERIL) 10 mg tablet Take 1 Tab by mouth three (3) times daily as needed for Muscle Spasm(s). 10 Tab 0     Past History     Past Medical History:  Past Medical History:   Diagnosis Date    Other ill-defined conditions(799.89)     hx of back pain     Past Surgical History:  Past Surgical History:   Procedure Laterality Date    HX OTHER SURGICAL      3rd finger of left hand     Family History:  History reviewed. No pertinent family history. Social History:  Social History     Tobacco Use    Smoking status: Current Every Day Smoker    Smokeless tobacco: Never Used   Substance Use Topics    Alcohol use: No    Drug use: Yes     Frequency: 7.0 times per week     Types: Marijuana, Heroin     Comment: used 5 hours ago     Allergies: Allergies   Allergen Reactions    Penicillins Anaphylaxis     Review of Systems   Review of Systems   Constitutional: Negative for activity change, appetite change, chills, fatigue and fever. HENT: Positive for dental problem and facial swelling. Negative for congestion, drooling, ear pain, mouth sores, postnasal drip, rhinorrhea and sore throat. Eyes: Negative for pain and discharge. Respiratory: Negative. Negative for apnea, cough and shortness of breath. Cardiovascular: Negative. Negative for chest pain. Gastrointestinal: Negative. Negative for abdominal pain, constipation, diarrhea, nausea and vomiting. Musculoskeletal: Negative. Skin: Negative. Negative for color change and rash. Neurological: Negative. Negative for light-headedness and headaches. Psychiatric/Behavioral: Negative. Physical Exam   Physical Exam  Vitals signs and nursing note reviewed. Constitutional:       General: He is not in acute distress. Appearance: Normal appearance. He is well-developed. He is not ill-appearing, toxic-appearing or diaphoretic. HENT:      Head: Normocephalic and atraumatic. Jaw: There is normal jaw occlusion. No trismus or swelling. Comments: Swelling to left maxillary region.      Right Ear: Hearing, tympanic membrane, ear canal and external ear normal.      Left Ear: Hearing, tympanic membrane, ear canal and external ear normal.      Nose: No mucosal edema or rhinorrhea. Right Sinus: No maxillary sinus tenderness or frontal sinus tenderness. Left Sinus: Maxillary sinus tenderness present. No frontal sinus tenderness. Mouth/Throat:      Mouth: No oral lesions or angioedema. Dentition: Abnormal dentition. Dental tenderness, dental caries and dental abscesses (#14.) present. Pharynx: Oropharynx is clear. Uvula midline. No pharyngeal swelling, oropharyngeal exudate, posterior oropharyngeal erythema or uvula swelling. Tonsils: No tonsillar exudate or tonsillar abscesses. Eyes:      Conjunctiva/sclera: Conjunctivae normal.      Pupils: Pupils are equal, round, and reactive to light. Neck:      Musculoskeletal: Normal range of motion and neck supple. No neck rigidity. Trachea: Trachea and phonation normal.   Pulmonary:      Effort: Pulmonary effort is normal. No accessory muscle usage or respiratory distress. Skin:     General: Skin is warm and dry. Coloration: Skin is not pale. Neurological:      Mental Status: He is alert and oriented to person, place, and time. Psychiatric:         Speech: Speech normal.         Behavior: Behavior normal.         Thought Content: Thought content normal.         Judgment: Judgment normal.       Diagnostic Study Results   Labs -   No results found for this or any previous visit (from the past 12 hour(s)). Radiologic Studies -   No orders to display     No results found. Medical Decision Making   I am the first provider for this patient. I reviewed the vital signs, available nursing notes, past medical history, past surgical history, family history and social history. Vital Signs-Reviewed the patient's vital signs.   Patient Vitals for the past 12 hrs:   Temp Pulse Resp BP SpO2   03/07/20 1107 97.5 °F (36.4 °C) 63 18 123/80 95 %     Pulse Oximetry Analysis - 95% on RA    Records Reviewed: Nursing Notes, Old Medical Records, Previous Radiology Studies and Previous Laboratory Studies    Provider Notes (Medical Decision Making):   Patient presents with left facial swelling. +dental abscess. No red flags that make PTA, RPA, ludwigs angina concerning. Pt declining I&D. Will tx with antibiotics and outpatient analgesics. Given information on dentists and importance of followup and no smoking. TOBACCO COUNSELING:  Upon evaluation, pt expressed that they are a current tobacco user. Pt has been counseled on the dangers of smoking and was encouraged to quit as soon as possible in order to decrease further risks to their health. Pt has conveyed their understanding of the risks involved should they continue to use tobacco products. ED Course:   Initial assessment performed. The patients presenting problems have been discussed, and they are in agreement with the care plan formulated and outlined with them. I have encouraged them to ask questions as they arise throughout their visit. ED Course as of Mar 07 1119   Sat Mar 07, 2020   1113 Patient declining I&D of dental abscess today. Advised on risk of worsening symptoms. Patient voices understanding. Will follow up with Dentist.    [SM]      ED Course User Index  [SM] Sunday Zamudio PA-C       Progress Note:   Updated pt on all returned results and findings. Discussed the importance of proper follow up as referred below along with return precautions. Pt in agreement with the care plan and expresses agreement with and understanding of all items discussed. Disposition:  11:16 AM  I have discussed with patient their diagnosis, treatment, and follow up plan. The patient agrees to follow up as outlined in discharge paperwork and also to return to the ED with any worsening. Nissa Chaparro PA-C      PLAN:  1.    Current Discharge Medication List      START taking these medications Details   clindamycin (CLEOCIN) 300 mg capsule Take 1 Cap by mouth four (4) times daily for 7 days. Qty: 28 Cap, Refills: 0      ibuprofen (MOTRIN) 800 mg tablet Take 1 Tab by mouth every six (6) hours as needed for Pain for up to 7 days. Qty: 20 Tab, Refills: 0         CONTINUE these medications which have NOT CHANGED    Details   aspirin 81 mg chewable tablet 81 mg, PO, daily, # 30 tab, 11 Refills, Pharmacy: [facility]      metoprolol tartrate (LOPRESSOR) 25 mg tablet TAKE 1/2 TABLET BY MOUTH TWO TIMES A DAY  Refills: 3      nitroglycerin (NITROSTAT) 0.4 mg SL tablet DISSOLVE 1 TABLET UNDER THE TONGUE AS NEEDED FOR CHEST PAIN, MAY REPEAT EVERY 5 MINUTES FOR 3 TOTAL DOSES. Refills: 3      buprenorphine-naloxone 8-2 mg subl PLACE TWO TABLETS UNDER THE TONGUE EVERY DAY FOR 8 DAYS. Refills: 0    Comments: .      carvedilol (COREG) 3.125 mg tablet TAKE ONE TABLET BY MOUTH EVERY 12 HOURS  Refills: 11      acetaminophen (TYLENOL EXTRA STRENGTH) 500 mg tablet Take 2 Tabs by mouth every six (6) hours as needed for Pain. Qty: 20 Tab, Refills: 0      aspirin delayed-release 81 mg tablet Take 1 Tab by mouth daily. Qty: 30 Tab, Refills: 0      cyclobenzaprine (FLEXERIL) 10 mg tablet Take 1 Tab by mouth three (3) times daily as needed for Muscle Spasm(s). Qty: 10 Tab, Refills: 0           2. Follow-up Information     Follow up With Specialties Details Why Contact Info    [facility]  Schedule an appointment as soon as possible for a visit in 1 day As needed [address] [place]  [phone]        Return to ED if worse     Diagnosis     Clinical Impression:   1. Dental abscess            Please note that this dictation was completed with Dragon, computer voice recognition software. Quite often unanticipated grammatical, syntax, homophones, and other interpretive errors are inadvertently transcribed by the computer software. Please disregard these errors. Additionally, please excuse any errors that have escaped final proofreading.

## 2020-03-07 NOTE — ED TRIAGE NOTES
Patient presents to ED with c/o left side facial swelling upon waking this morning.  Denies any pain

## 2020-03-07 NOTE — DISCHARGE INSTRUCTIONS
Patient Education        Abscessed Tooth: Care Instructions  Your Care Instructions    An abscessed tooth is a tooth that has a pocket of pus in the tissues around it. Pus forms when the body tries to fight an infection caused by bacteria. If the pus cannot drain, it forms an abscess. An abscessed tooth can cause red, swollen gums and throbbing pain, especially when you chew. You may have a bad taste in your mouth and a fever, and your jaw may swell. Damage to the tooth, untreated tooth decay, or gum disease can cause an abscessed tooth. An abscessed tooth needs to be treated by a dental professional right away. If it is not treated, the infection could spread to other parts of your body. Your dentist will give you antibiotics to stop the infection. He or she may make a hole in the tooth or cut open (linda) the abscess inside your mouth so that the infection can drain, which should relieve your pain. You may need to have a root canal treatment, which tries to save your tooth by taking out the infected pulp and replacing it with a healing medicine and/or a filling. If these treatments do not work, your tooth may have to be removed. Follow-up care is a key part of your treatment and safety. Be sure to make and go to all appointments, and call your doctor if you are having problems. It's also a good idea to know your test results and keep a list of the medicines you take. How can you care for yourself at home? · Reduce pain and swelling in your face and jaw by putting ice or a cold pack on the outside of your cheek for 10 to 20 minutes at a time. Put a thin cloth between the ice and your skin. · Take pain medicines exactly as directed. ? If the doctor gave you a prescription medicine for pain, take it as prescribed. ? If you are not taking a prescription pain medicine, ask your doctor if you can take an over-the-counter medicine. · Take your antibiotics as directed.  Do not stop taking them just because you feel better. You need to take the full course of antibiotics. To prevent tooth abscess  · Brush and floss every day, and have regular dental checkups. · Eat a healthy diet, and avoid sugary foods and drinks. · Do not smoke, use e-cigarettes with nicotine, or use spit tobacco. Tobacco and nicotine slow your ability to heal. Tobacco also increases your risk for gum disease and cancer of the mouth and throat. If you need help quitting, talk to your doctor about stop-smoking programs and medicines. These can increase your chances of quitting for good. When should you call for help? Call 911 anytime you think you may need emergency care. For example, call if:    · You have trouble breathing.    Call your doctor now or seek immediate medical care if:    · You have new or worse symptoms of infection, such as:  ? Increased pain, swelling, warmth, or redness. ? Red streaks leading from the area. ? Pus draining from the area. ? A fever.    Watch closely for changes in your health, and be sure to contact your doctor if:    · You do not get better as expected. Where can you learn more? Go to http://andres-melany.info/. Enter B779 in the search box to learn more about \"Abscessed Tooth: Care Instructions. \"  Current as of: October 3, 2018  Content Version: 12.2  © 5079-3320 [employer]. Care instructions adapted under license by [employer] (which disclaims liability or warranty for this information). If you have questions about a medical condition or this instruction, always ask your healthcare professional. [place] [address] any warranty or liability for your use of this information.        Emergency [address] by [facility]  [address] [facility], [address]  Open Deneen Farah, F: 8AM - 5PM and T, Th: 8AM-6PM  Phone: [phone], press 4  $70 for Emergency Care  $60 for first routine care, then pay by sliding scale based upon income. [facility]  [address], [address]  Phone: [phone]    [facility] [address] [place] [address] Dentistry  [address] [place] [address], [address]  Phone: [phone]  Fee: $75 Routine Extraction, $125 Complex Extraction  Open Monday - Friday 8AM - 5:00PM    [facility]  [address], [address]  Open Monday - Friday 8AM - 4:30 PM  Phone: [phone] of Dentistry [address] Dentistry, [address] [place] [address], 1st Floor  [facility] starting at 8:30 AM -F  Phone: [phone], press 2  Fee: $150 per tooth (x-ray & extractions only)  Pediatrics Phone[de-identified] [phone], 8-5 M-F    [address] Dentistry, [address] [place] [address], 2nd Floor, [address] [place] [address] starting at 8:30 AM - 3 PM [address] [place] [address], [address] [place] [address]  Phone: [phone] or [phone]  Emergency Hours: 9:30AM - 11AM (extractions)  Simple tooth extraction $ per tooth. #75 for x-ray    [facility] Residents only, over the age of 25  Phone: [phone]. Leave message saying you need an appointment to register.   Hours: Tuesday Evenings

## 2020-03-07 NOTE — ED NOTES
Patient here with c/o dental pain with right side facial swelling. Patient states ongoing dental issues, states he had an appointment scheduled for Monday however states that he woke up this morning to right side facial swelling. Patient denies airway complications with swelling, denies vision changes. Patient denies fevers. Emergency Department Nursing Plan of Care       The Nursing Plan of Care is developed from the Nursing assessment and Emergency Department Attending provider initial evaluation. The plan of care may be reviewed in the ED Provider note.     The Plan of Care was developed with the following considerations:   Patient / Family readiness to learn indicated by:verbalized understanding  Persons(s) to be included in education: patient, family  Barriers to Learning/Limitations:No    Signed     Shavonne Harkins RN    3/7/2020   11:17 AM

## 2020-05-10 ENCOUNTER — HOSPITAL ENCOUNTER (EMERGENCY)
Age: 50
Discharge: HOME | End: 2020-05-10
Attending: EMERGENCY MEDICINE
Payer: COMMERCIAL

## 2020-05-10 VITALS
HEIGHT: 74 IN | BODY MASS INDEX: 26.95 KG/M2 | RESPIRATION RATE: 14 BRPM | TEMPERATURE: 99 F | OXYGEN SATURATION: 98 % | WEIGHT: 210 LBS

## 2020-05-10 DIAGNOSIS — K04.7 DENTAL ABSCESS: Primary | ICD-10-CM

## 2020-05-10 PROCEDURE — 74011250637 HC RX REV CODE- 250/637: Performed by: EMERGENCY MEDICINE

## 2020-05-10 PROCEDURE — 99283 EMERGENCY DEPT VISIT LOW MDM: CPT

## 2020-05-10 RX ORDER — IBUPROFEN 800 MG/1
800 TABLET ORAL
Qty: 20 TAB | Refills: 0 | OUTPATIENT
Start: 2020-05-10 | End: 2020-09-07

## 2020-05-10 RX ORDER — IBUPROFEN 400 MG/1
800 TABLET ORAL
Status: COMPLETED | OUTPATIENT
Start: 2020-05-10 | End: 2020-05-10

## 2020-05-10 RX ORDER — CLINDAMYCIN HYDROCHLORIDE 300 MG/1
300 CAPSULE ORAL 4 TIMES DAILY
Qty: 40 CAP | Refills: 0 | OUTPATIENT
Start: 2020-05-10 | End: 2020-09-07

## 2020-05-10 RX ORDER — CLINDAMYCIN HYDROCHLORIDE 150 MG/1
300 CAPSULE ORAL
Status: COMPLETED | OUTPATIENT
Start: 2020-05-10 | End: 2020-05-10

## 2020-05-10 RX ADMIN — IBUPROFEN 800 MG: 400 TABLET ORAL at 14:31

## 2020-05-10 RX ADMIN — CLINDAMYCIN HYDROCHLORIDE 300 MG: 150 CAPSULE ORAL at 14:31

## 2020-05-10 NOTE — ED NOTES
Pt presents ambulatory to ED complaining of left upper dental pain x3 days. Pt is alert and oriented x 4, RR even and unlabored, skin is warm and dry. Assesment completed and pt updated on plan of care. Emergency Department Nursing Plan of Care       The Nursing Plan of Care is developed from the Nursing assessment and Emergency Department Attending provider initial evaluation. The plan of care may be reviewed in the ED Provider note.     The Plan of Care was developed with the following considerations:   Patient / Family readiness to learn indicated by:verbalized understanding  Persons(s) to be included in education: patient  Barriers to Learning/Limitations:No    Signed     Yamilka Lai RN    5/10/2020   2:46 PM

## 2020-05-10 NOTE — ED PROVIDER NOTES
EMERGENCY DEPARTMENT HISTORY AND PHYSICAL EXAM      Date: 5/10/2020  Patient Name: Miguel Angel Berry    History of Presenting Illness     No chief complaint on file. History Provided By: Patient    HPI: Miguel Angel Berry, 47 y.o. male presents to the ED with cc of moderate, worsening dental abscess to 2nd L molar for ~ 2 months with associated facial swelling. Per chart review, pt was evaluated in ED 03/07/2020 for same symptoms. Pt reports he has not been evaluated by a dentist for symptoms. He denies any modifying factors. He specifically denies any fevers, chills, nausea, vomiting, chest pain, shortness of breath, headache, rash, diarrhea, sweating or weight loss. There are no other complaints, changes, or physical findings at this time. PCP: None    No current facility-administered medications on file prior to encounter. Current Outpatient Medications on File Prior to Encounter   Medication Sig Dispense Refill    aspirin 81 mg chewable tablet 81 mg, PO, daily, # 30 tab, 11 Refills, Pharmacy: [facility]      metoprolol tartrate (LOPRESSOR) 25 mg tablet TAKE 1/2 TABLET BY MOUTH TWO TIMES A DAY  3    nitroglycerin (NITROSTAT) 0.4 mg SL tablet DISSOLVE 1 TABLET UNDER THE TONGUE AS NEEDED FOR CHEST PAIN, MAY REPEAT EVERY 5 MINUTES FOR 3 TOTAL DOSES. 3    buprenorphine-naloxone 8-2 mg subl PLACE TWO TABLETS UNDER THE TONGUE EVERY DAY FOR 8 DAYS.  0    carvedilol (COREG) 3.125 mg tablet TAKE ONE TABLET BY MOUTH EVERY 12 HOURS  11    acetaminophen (TYLENOL EXTRA STRENGTH) 500 mg tablet Take 2 Tabs by mouth every six (6) hours as needed for Pain. 20 Tab 0    aspirin delayed-release 81 mg tablet Take 1 Tab by mouth daily. 30 Tab 0    cyclobenzaprine (FLEXERIL) 10 mg tablet Take 1 Tab by mouth three (3) times daily as needed for Muscle Spasm(s).  10 Tab 0       Past History     Past Medical History:  Past Medical History:   Diagnosis Date    Other ill-defined conditions(700.17)     hx of back pain       Past Surgical History:  Past Surgical History:   Procedure Laterality Date    HX OTHER SURGICAL      3rd finger of left hand       Family History:  No family history on file. Social History:  Social History     Tobacco Use    Smoking status: Current Every Day Smoker    Smokeless tobacco: Never Used   Substance Use Topics    Alcohol use: No    Drug use: Yes     Frequency: 7.0 times per week     Types: Marijuana, Heroin     Comment: used 5 hours ago       Allergies: Allergies   Allergen Reactions    Penicillins Anaphylaxis         Review of Systems   Review of Systems   Constitutional: Negative for fever. HENT: Positive for dental problem and facial swelling. Negative for sore throat and trouble swallowing. Eyes: Negative for photophobia and redness. Respiratory: Negative for cough and shortness of breath. Cardiovascular: Negative for chest pain and leg swelling. Gastrointestinal: Negative for abdominal pain, constipation, diarrhea, nausea and vomiting. Endocrine: Negative for polydipsia and polyuria. Genitourinary: Negative for dysuria, hematuria and scrotal swelling. Musculoskeletal: Negative for back pain and joint swelling. Skin: Negative for rash. Neurological: Negative for dizziness, syncope, weakness and headaches. Psychiatric/Behavioral: Negative for suicidal ideas. All other systems reviewed and are negative. Physical Exam   Physical Exam  Vitals signs and nursing note reviewed. Constitutional:       General: He is not in acute distress. Appearance: He is well-developed. HENT:      Head: Normocephalic and atraumatic. Mouth/Throat:      Dentition: Dental caries (multiple) and dental abscesses present. Pharynx: No oropharyngeal exudate. Comments: Extensive tooth decay   Eyes:      General:         Left eye: No discharge. Conjunctiva/sclera: Conjunctivae normal.      Pupils: Pupils are equal, round, and reactive to light. Neck:      Musculoskeletal: Normal range of motion and neck supple. Vascular: No JVD. Cardiovascular:      Rate and Rhythm: Normal rate and regular rhythm. Heart sounds: Normal heart sounds. Pulmonary:      Effort: Pulmonary effort is normal. No respiratory distress. Breath sounds: Normal breath sounds. No wheezing. Abdominal:      General: Bowel sounds are normal. There is no distension. Palpations: Abdomen is soft. Tenderness: There is no abdominal tenderness. There is no guarding or rebound. Musculoskeletal: Normal range of motion. General: No tenderness. Lymphadenopathy:      Cervical: No cervical adenopathy. Skin:     General: Skin is warm and dry. Findings: No rash. Neurological:      Mental Status: He is alert and oriented to person, place, and time. Cranial Nerves: No cranial nerve deficit. Deep Tendon Reflexes: Reflexes are normal and symmetric. Psychiatric:         Behavior: Behavior normal.         Diagnostic Study Results     Labs -   No results found for this or any previous visit (from the past 12 hour(s)). Radiologic Studies -   None    Medical Decision Making   I am the first provider for this patient. I reviewed the vital signs, available nursing notes, past medical history, past surgical history, family history and social history. Vital Signs-Reviewed the patient's vital signs. Patient Vitals for the past 12 hrs:   Temp Resp SpO2   05/10/20 1414 99 °F (37.2 °C) 14 98 %     Records Reviewed: Nursing Notes, Old Medical Records, Previous Radiology Studies and Previous Laboratory Studies    Provider Notes (Medical Decision Making):   Dental abscess, Fx tooth, dental caries    ED Course:   Initial assessment performed. The patients presenting problems have been discussed, and they are in agreement with the care plan formulated and outlined with them.   I have encouraged them to ask questions as they arise throughout their visit.      Critical Care Time: 0    Disposition:  Discharge Note:  2:27 PM  The pt is ready for discharge. The pt's signs, symptoms, diagnosis, and discharge instructions have been discussed and pt has conveyed their understanding. The pt is to follow up as recommended or return to ER should their symptoms worsen. Plan has been discussed and pt is in agreement. DISCHARGE PLAN:  1. Current Discharge Medication List      START taking these medications    Details   clindamycin (CLEOCIN) 300 mg capsule Take 1 Cap by mouth four (4) times daily. Qty: 40 Cap, Refills: 0      ibuprofen (MOTRIN) 800 mg tablet Take 1 Tab by mouth every eight (8) hours as needed for Pain. Qty: 20 Tab, Refills: 0           2. Follow-up Information     Follow up With Specialties Details Why Contact Info    [facility]  In 1 week  [address]  [phone]        3. Return to ED if worse     Diagnosis     Clinical Impression:   1. Dental abscess        Attestations: This note is prepared by Darrin Gabriel, acting as Scribe for  Ana Guaman MD.     Ana Guaman MD: The scribe's documentation has been prepared under my direction and personally reviewed by me in its entirety.  I confirm that the note above accurately reflects all work, treatment, procedures, and medical decision making performed by me

## 2020-05-10 NOTE — DISCHARGE INSTRUCTIONS
Patient Education        Abscessed Tooth: Care Instructions  Your Care Instructions    An abscessed tooth is a tooth that has a pocket of pus in the tissues around it. Pus forms when the body tries to fight an infection caused by bacteria. If the pus cannot drain, it forms an abscess. An abscessed tooth can cause red, swollen gums and throbbing pain, especially when you chew. You may have a bad taste in your mouth and a fever, and your jaw may swell. Damage to the tooth, untreated tooth decay, or gum disease can cause an abscessed tooth. An abscessed tooth needs to be treated by a dental professional right away. If it is not treated, the infection could spread to other parts of your body. Your dentist will give you antibiotics to stop the infection. He or she may make a hole in the tooth or cut open (linda) the abscess inside your mouth so that the infection can drain, which should relieve your pain. You may need to have a root canal treatment, which tries to save your tooth by taking out the infected pulp and replacing it with a healing medicine and/or a filling. If these treatments do not work, your tooth may have to be removed. Follow-up care is a key part of your treatment and safety. Be sure to make and go to all appointments, and call your doctor if you are having problems. It's also a good idea to know your test results and keep a list of the medicines you take. How can you care for yourself at home? · Reduce pain and swelling in your face and jaw by putting ice or a cold pack on the outside of your cheek. Do this for 10 to 20 minutes at a time. Put a thin cloth between the ice and your skin. · Take pain medicines exactly as directed. ? If the doctor gave you a prescription medicine for pain, take it as prescribed. ? If you are not taking a prescription pain medicine, ask your doctor if you can take an over-the-counter medicine. · Take antibiotics as directed.  Do not stop taking them just because you feel better. You need to take the full course of antibiotics. To prevent tooth abscess  · Brush and floss every day. Have regular dental checkups. · Eat a healthy diet. Avoid sugary foods and drinks. · Do not smoke or vape with nicotine. And don't use spit tobacco. Tobacco and nicotine slow your ability to heal. They increase your risk for gum disease and cancer of the mouth and throat. If you need help quitting, talk to your doctor about stop-smoking programs and medicines. These can increase your chances of quitting for good. When should you call for help? Call 911 anytime you think you may need emergency care. For example, call if:    · You have trouble breathing.    Call your doctor now or seek immediate medical care if:    · You have new or worse symptoms of infection, such as:  ? Increased pain, swelling, warmth, or redness. ? Red streaks leading from the area. ? Pus draining from the area. ? A fever.    Watch closely for changes in your health, and be sure to contact your doctor if:    · You do not get better as expected. Where can you learn more? Go to http://andres-melany.info/  Enter L466 in the search box to learn more about \"Abscessed Tooth: Care Instructions. \"  Current as of: July 28, 2019Content Version: 12.4  © 7463-0405 [employer]. Care instructions adapted under license by [employer] (which disclaims liability or warranty for this information). If you have questions about a medical condition or this instruction, always ask your healthcare professional. [place] [address] any warranty or liability for your use of this information.

## 2020-09-07 ENCOUNTER — HOSPITAL ENCOUNTER (EMERGENCY)
Age: 50
Discharge: HOME | End: 2020-09-07
Attending: EMERGENCY MEDICINE
Payer: COMMERCIAL

## 2020-09-07 VITALS
HEIGHT: 75 IN | OXYGEN SATURATION: 100 % | BODY MASS INDEX: 26.11 KG/M2 | HEART RATE: 63 BPM | DIASTOLIC BLOOD PRESSURE: 87 MMHG | WEIGHT: 210 LBS | TEMPERATURE: 98.7 F | RESPIRATION RATE: 16 BRPM | SYSTOLIC BLOOD PRESSURE: 133 MMHG

## 2020-09-07 DIAGNOSIS — R22.0 LEFT FACIAL SWELLING: ICD-10-CM

## 2020-09-07 DIAGNOSIS — K04.7 DENTAL ABSCESS: Primary | ICD-10-CM

## 2020-09-07 PROCEDURE — 99282 EMERGENCY DEPT VISIT SF MDM: CPT

## 2020-09-07 RX ORDER — CLINDAMYCIN HYDROCHLORIDE 300 MG/1
300 CAPSULE ORAL 4 TIMES DAILY
Qty: 28 CAP | Refills: 0 | Status: SHIPPED | OUTPATIENT
Start: 2020-09-07 | End: 2020-09-14

## 2020-09-07 RX ORDER — TRAMADOL HYDROCHLORIDE 50 MG/1
50 TABLET ORAL
Qty: 10 TAB | Refills: 0 | Status: SHIPPED | OUTPATIENT
Start: 2020-09-07 | End: 2020-09-10

## 2020-09-07 NOTE — DISCHARGE INSTRUCTIONS
Patient Education        Abscessed Tooth: Care Instructions  Your Care Instructions     An abscessed tooth is a tooth that has a pocket of pus in the tissues around it. Pus forms when the body tries to fight an infection caused by bacteria. If the pus cannot drain, it forms an abscess. An abscessed tooth can cause red, swollen gums and throbbing pain, especially when you chew. You may have a bad taste in your mouth and a fever, and your jaw may swell. Damage to the tooth, untreated tooth decay, or gum disease can cause an abscessed tooth. An abscessed tooth needs to be treated by a dental professional right away. If it is not treated, the infection could spread to other parts of your body. Your dentist will give you antibiotics to stop the infection. He or she may make a hole in the tooth or cut open (linda) the abscess inside your mouth so that the infection can drain, which should relieve your pain. You may need to have a root canal treatment, which tries to save your tooth by taking out the infected pulp and replacing it with a healing medicine and/or a filling. If these treatments do not work, your tooth may have to be removed. Follow-up care is a key part of your treatment and safety. Be sure to make and go to all appointments, and call your doctor if you are having problems. It's also a good idea to know your test results and keep a list of the medicines you take. How can you care for yourself at home? · Reduce pain and swelling in your face and jaw by putting ice or a cold pack on the outside of your cheek. Do this for 10 to 20 minutes at a time. Put a thin cloth between the ice and your skin. · Take pain medicines exactly as directed. ? If the doctor gave you a prescription medicine for pain, take it as prescribed. ? If you are not taking a prescription pain medicine, ask your doctor if you can take an over-the-counter medicine. · Take antibiotics as directed.  Do not stop taking them just because you feel better. You need to take the full course of antibiotics. To prevent tooth abscess  · Brush and floss every day. Have regular dental checkups. · Eat a healthy diet. Avoid sugary foods and drinks. · Do not smoke or vape with nicotine. And don't use spit tobacco. Tobacco and nicotine slow your ability to heal. They increase your risk for gum disease and cancer of the mouth and throat. If you need help quitting, talk to your doctor about stop-smoking programs and medicines. These can increase your chances of quitting for good. When should you call for help? Call 911 anytime you think you may need emergency care. For example, call if:    · You have trouble breathing. Call your doctor now or seek immediate medical care if:    · You have new or worse symptoms of infection, such as:  ? Increased pain, swelling, warmth, or redness. ? Red streaks leading from the area. ? Pus draining from the area. ? A fever. Watch closely for changes in your health, and be sure to contact your doctor if:    · You do not get better as expected. Where can you learn more? Go to http://andres-melany.info/  Enter L466 in the search box to learn more about \"Abscessed Tooth: Care Instructions. \"  Current as of: March 25, 2020               Content Version: 12.6  © 6107-8005 [employer]. Care instructions adapted under license by [employer] (which disclaims liability or warranty for this information). If you have questions about a medical condition or this instruction, always ask your healthcare professional. [place] [address] any warranty or liability for your use of this information.

## 2020-09-07 NOTE — ED PROVIDER NOTES
EMERGENCY DEPARTMENT HISTORY AND PHYSICAL EXAM    Date: 9/7/2020  Patient Name: Starla Cano    History of Presenting Illness     Chief Complaint   Patient presents with    Dental Pain         History Provided By: Patient    HPI: Starla Cano is a 45 y.o. male with a PMH of hypertension and myocardial infarction who presents with left upper facial swelling upon waking up this morning. Patient states he started having dental pain yesterday but woke up around 1 AM and noticed that his face is swollen. Patient rates pain 8 out of 10. Patient states he was supposed to have teeth pulled a while ago but due to COVID-19 he has been unable to follow-up with a dentist.  Patient denies any fevers or chills, no chest pain or shortness of breath. There are no alleviating or exacerbating factors reported. Patient has not taken anything for symptoms prior to arrival.    PCP: Georgina Altamirano MD    Current Outpatient Medications   Medication Sig Dispense Refill    clindamycin (CLEOCIN) 300 mg capsule Take 1 Cap by mouth four (4) times daily for 7 days. 28 Cap 0    traMADoL (Ultram) 50 mg tablet Take 1 Tab by mouth every six (6) hours as needed for Pain for up to 3 days. Max Daily Amount: 200 mg. 10 Tab 0    aspirin 81 mg chewable tablet 81 mg, PO, daily, # 30 tab, 11 Refills, Pharmacy: [facility]      metoprolol tartrate (LOPRESSOR) 25 mg tablet TAKE 1/2 TABLET BY MOUTH TWO TIMES A DAY  3    nitroglycerin (NITROSTAT) 0.4 mg SL tablet DISSOLVE 1 TABLET UNDER THE TONGUE AS NEEDED FOR CHEST PAIN, MAY REPEAT EVERY 5 MINUTES FOR 3 TOTAL DOSES.   3    carvedilol (COREG) 3.125 mg tablet TAKE ONE TABLET BY MOUTH EVERY 12 HOURS  11       Past History     Past Medical History:  Past Medical History:   Diagnosis Date    Other ill-defined conditions(259.89)     hx of back pain       Past Surgical History:  Past Surgical History:   Procedure Laterality Date    HX OTHER SURGICAL      3rd finger of left hand Family History:  No family history on file. Social History:  Social History     Tobacco Use    Smoking status: Current Every Day Smoker    Smokeless tobacco: Never Used   Substance Use Topics    Alcohol use: No    Drug use: Yes     Frequency: 7.0 times per week     Types: Marijuana, Heroin     Comment: used 5 hours ago       Allergies: Allergies   Allergen Reactions    Penicillins Anaphylaxis         Review of Systems   Review of Systems   Constitutional: Negative for chills and fever. HENT: Positive for dental problem and facial swelling. Gastrointestinal: Negative for nausea and vomiting. Allergic/Immunologic: Negative for immunocompromised state. Neurological: Negative for speech difficulty and weakness. All other systems reviewed and are negative. Physical Exam     Vitals:    09/07/20 1320   BP: 133/87   Pulse: 63   Resp: 16   Temp: 98.7 °F (37.1 °C)   SpO2: 100%   Weight: 95.3 kg (210 lb)   Height: 6' 3\" (1.905 m)     Physical Exam  Vitals signs and nursing note reviewed. Constitutional:       General: He is not in acute distress. Appearance: He is well-developed. HENT:      Head: Normocephalic and atraumatic. Right Ear: Tympanic membrane normal.      Left Ear: Tympanic membrane normal.      Mouth/Throat:      Dentition: Abnormal dentition ( Poor dentition throughout only decayed fragments of teeth left in the gum visible, no obvious abscess noted ). Dental caries present. Pharynx: Oropharynx is clear. Uvula midline. No oropharyngeal exudate. Eyes:      Conjunctiva/sclera: Conjunctivae normal.   Cardiovascular:      Rate and Rhythm: Normal rate and regular rhythm. Heart sounds: Normal heart sounds. Pulmonary:      Effort: Pulmonary effort is normal. No respiratory distress. Breath sounds: Normal breath sounds. No wheezing or rales. Musculoskeletal: Normal range of motion. Skin:     General: Skin is warm and dry.    Neurological:      Mental Status: He is alert and oriented to person, place, and time. Psychiatric:         Mood and Affect: Mood normal.         Behavior: Behavior normal.         Thought Content: Thought content normal.         Judgment: Judgment normal.           Diagnostic Study Results     Labs -   No results found for this or any previous visit (from the past 12 hour(s)). Radiologic Studies -   No orders to display     CT Results  (Last 48 hours)    None        CXR Results  (Last 48 hours)    None            Medical Decision Making   I am the first provider for this patient. I reviewed the vital signs, available nursing notes, past medical history, past surgical history, family history and social history. Vital Signs-Reviewed the patient's vital signs. Records Reviewed: Old Medical Records    Disposition:  Discharged    DISCHARGE NOTE:   1:58 PM      Care plan outlined and precautions discussed. Patient has no new complaints, changes, or physical findings. All medications were reviewed with the patient; will d/c home. All of pt's questions and concerns were addressed. Patient was instructed and agrees to follow up with dentist, as well as to return to the ED upon further deterioration. Patient is ready to go home. Follow-up Information     Follow up With Specialties Details Why Contact Info    [facility]    [address] [place]  [phone]    Bhavani Das    You can also follow up with Bhavani [address] [place] [address]  [phone]            Current Discharge Medication List      START taking these medications    Details   traMADoL (Ultram) 50 mg tablet Take 1 Tab by mouth every six (6) hours as needed for Pain for up to 3 days. Max Daily Amount: 200 mg.   Qty: 10 Tab, Refills: 0    Associated Diagnoses: Dental abscess         CONTINUE these medications which have CHANGED    Details   clindamycin (CLEOCIN) 300 mg capsule Take 1 Cap by mouth four (4) times daily for 7 days. Qty: 28 Cap, Refills: 0         STOP taking these medications       ibuprofen (MOTRIN) 800 mg tablet Comments:   Reason for Stopping:               Provider Notes (Medical Decision Making):   Patient presents with dental pain and facial swelling. No abscess that needs drainage but obvious underlying abscess with facial swelling. No red flags that make PTA, RPA, ludwigs angina concerning. Will tx with  antibiotics and outpatient analgesics. Given information on dentists and importance of followup. Procedures:  Procedures    Please note that this dictation was completed with Dragon, computer voice recognition software. Quite often unanticipated grammatical, syntax, homophones, and other interpretive errors are inadvertently transcribed by the computer software. Please disregard these errors. Additionally, please excuse any errors that have escaped final proofreading. Diagnosis     Clinical Impression:   1. Dental abscess    2.  Left facial swelling

## 2020-09-07 NOTE — ED NOTES
Pt presents to ED ambulatory complaining of left upper dental pain x yesterday. Pt is alert and oriented x 4, RR even and unlabored, skin is warm and dry. Assessment completed and pt updated on plan of care. Call bell in reach. Emergency Department Nursing Plan of Care       The Nursing Plan of Care is developed from the Nursing assessment and Emergency Department Attending provider initial evaluation. The plan of care may be reviewed in the ED Provider note.     The Plan of Care was developed with the following considerations:   Patient / Family readiness to learn indicated by:verbalized understanding  Persons(s) to be included in education: patient  Barriers to Learning/Limitations:No    Signed     Darnell Pleva    9/7/2020   1:40 PM

## 2021-01-09 ENCOUNTER — HOSPITAL ENCOUNTER (EMERGENCY)
Age: 51
Discharge: HOME | End: 2021-01-09
Attending: EMERGENCY MEDICINE
Payer: COMMERCIAL

## 2021-01-09 VITALS
OXYGEN SATURATION: 96 % | SYSTOLIC BLOOD PRESSURE: 145 MMHG | BODY MASS INDEX: 26.25 KG/M2 | WEIGHT: 210 LBS | DIASTOLIC BLOOD PRESSURE: 97 MMHG | RESPIRATION RATE: 18 BRPM | HEART RATE: 78 BPM | TEMPERATURE: 98.8 F

## 2021-01-09 DIAGNOSIS — R22.0 LEFT FACIAL SWELLING: ICD-10-CM

## 2021-01-09 DIAGNOSIS — K08.89 DENTALGIA: ICD-10-CM

## 2021-01-09 DIAGNOSIS — K04.7 DENTAL INFECTION: Primary | ICD-10-CM

## 2021-01-09 DIAGNOSIS — Z72.0 TOBACCO ABUSE: ICD-10-CM

## 2021-01-09 PROCEDURE — 74011250637 HC RX REV CODE- 250/637: Performed by: EMERGENCY MEDICINE

## 2021-01-09 PROCEDURE — 99283 EMERGENCY DEPT VISIT LOW MDM: CPT

## 2021-01-09 PROCEDURE — 74011000250 HC RX REV CODE- 250: Performed by: EMERGENCY MEDICINE

## 2021-01-09 RX ORDER — CLINDAMYCIN HYDROCHLORIDE 150 MG/1
300 CAPSULE ORAL
Status: COMPLETED | OUTPATIENT
Start: 2021-01-09 | End: 2021-01-09

## 2021-01-09 RX ORDER — TRAMADOL HYDROCHLORIDE 50 MG/1
50 TABLET ORAL
Status: COMPLETED | OUTPATIENT
Start: 2021-01-09 | End: 2021-01-09

## 2021-01-09 RX ORDER — METHYLPREDNISOLONE 4 MG/1
TABLET ORAL
Qty: 1 DOSE PACK | Refills: 0 | Status: SHIPPED | OUTPATIENT
Start: 2021-01-09 | End: 2021-03-27

## 2021-01-09 RX ORDER — NAPROXEN 500 MG/1
500 TABLET ORAL 2 TIMES DAILY WITH MEALS
Qty: 20 TAB | Refills: 0 | Status: SHIPPED | OUTPATIENT
Start: 2021-01-09 | End: 2021-03-27

## 2021-01-09 RX ORDER — CLINDAMYCIN HYDROCHLORIDE 150 MG/1
300 CAPSULE ORAL 4 TIMES DAILY
Qty: 56 CAP | Refills: 0 | Status: SHIPPED | OUTPATIENT
Start: 2021-01-09 | End: 2021-01-16

## 2021-01-09 RX ORDER — TRAMADOL HYDROCHLORIDE 50 MG/1
50 TABLET ORAL
Qty: 18 TAB | Refills: 0 | Status: SHIPPED | OUTPATIENT
Start: 2021-01-09 | End: 2021-01-12

## 2021-01-09 RX ORDER — DEXAMETHASONE SODIUM PHOSPHATE 100 MG/10ML
10 INJECTION INTRAMUSCULAR; INTRAVENOUS
Status: COMPLETED | OUTPATIENT
Start: 2021-01-09 | End: 2021-01-09

## 2021-01-09 RX ADMIN — DIPHENHYDRAMINE HYDROCHLORIDE: 12.5 LIQUID ORAL at 11:49

## 2021-01-09 RX ADMIN — CLINDAMYCIN HYDROCHLORIDE 300 MG: 150 CAPSULE ORAL at 11:49

## 2021-01-09 RX ADMIN — TRAMADOL HYDROCHLORIDE 50 MG: 50 TABLET, FILM COATED ORAL at 11:49

## 2021-01-09 RX ADMIN — DEXAMETHASONE SODIUM PHOSPHATE 10 MG: 10 INJECTION INTRAMUSCULAR; INTRAVENOUS at 11:50

## 2021-01-09 NOTE — ED NOTES
Emergency Department Nursing Plan of Care       The Nursing Plan of Care is developed from the Nursing assessment and Emergency Department Attending provider initial evaluation. The plan of care may be reviewed in the ED Provider note.     The Plan of Care was developed with the following considerations:   Patient / Family readiness to learn indicated by:verbalized understanding  Persons(s) to be included in education: patient  Barriers to Learning/Limitations:No    [address]     1/9/2021   11:09 AM

## 2021-01-09 NOTE — ED NOTES
Pt arrives in the ED with complaints of left upper dental pain with moderate swelling starting last night.

## 2021-01-09 NOTE — ED PROVIDER NOTES
EMERGENCY DEPARTMENT HISTORY AND PHYSICAL EXAM      Please note that this dictation was completed with the assistance of \"Dragon\", the computer voice recognition software. Quite often unanticipated grammatical, syntax, homophones, and other interpretive errors are inadvertently transcribed by the computer software. Please disregard these errors and any errors that have escaped final proofreading. Thank you. Patient Name: Kathi Banks  : 1970  MRN: [id number]  History of Presenting Illness     CC: dental pain    History Provided By: Patient    HPI: Kathi Banks, 45 y.o. male with past medical history as documented below presents to the ED with c/o of acute onset of mild to moderate throbbing left upper dental pain onset last night. Patient states that he woke up this morning noticed his left face was more swollen. He denies any fevers, chills, neck pain. He took over-the-counter medications without significant relief of symptoms. He reports having a dentistry appointment coming up. Denies any history of diabetes. He denies any voice changes, difficulty swallowing, shortness of breath. Pt denies any other alleviating or exacerbating factors. Additionally, pt specifically denies any recent fever, chills, headache, nausea, vomiting, abdominal pain, CP, SOB, lightheadedness, dizziness, numbness, weakness, lower extremity swelling, heart palpitations, urinary sxs, diarrhea, constipation, melena, hematochezia, cough, or congestion. There are no other complaints, changes or physical findings pertinent to the HPI at this time.     PCP: Evelia, MD Georgina    Past History   Past Medical History:  Past Medical History:   Diagnosis Date    Ill-defined condition     heart attack    Other ill-defined conditions(799.89)     hx of back pain       Past Surgical History:  Past Surgical History:   Procedure Laterality Date    HX OTHER SURGICAL      3rd finger of left hand       Family History:  History reviewed. No pertinent family history. Social History:  Social History     Tobacco Use    Smoking status: Current Every Day Smoker    Smokeless tobacco: Never Used   Substance Use Topics    Alcohol use: No    Drug use: Yes     Frequency: 7.0 times per week     Types: Marijuana, Heroin     Comment: used 5 hours ago       Allergies: Allergies   Allergen Reactions    Penicillins Anaphylaxis       Current Medications:  No current facility-administered medications on file prior to encounter. Current Outpatient Medications on File Prior to Encounter   Medication Sig Dispense Refill    metoprolol tartrate (LOPRESSOR) 25 mg tablet TAKE 1/2 TABLET BY MOUTH TWO TIMES A DAY  3    carvedilol (COREG) 3.125 mg tablet TAKE ONE TABLET BY MOUTH EVERY 12 HOURS  11    nitroglycerin (NITROSTAT) 0.4 mg SL tablet DISSOLVE 1 TABLET UNDER THE TONGUE AS NEEDED FOR CHEST PAIN, MAY REPEAT EVERY 5 MINUTES FOR 3 TOTAL DOSES. 3     Review of Systems   Review of Systems   Constitutional: Negative. Negative for chills and fever. HENT: Positive for dental problem and facial swelling. Negative for congestion, rhinorrhea, sore throat, trouble swallowing and voice change. Eyes: Negative. Respiratory: Negative. Negative for apnea, cough, chest tightness, shortness of breath and wheezing. Cardiovascular: Negative. Negative for chest pain, palpitations and leg swelling. Gastrointestinal: Negative. Negative for abdominal distention, abdominal pain, blood in stool, constipation, diarrhea, nausea and vomiting. Endocrine: Negative. Negative for cold intolerance, heat intolerance and polyuria. Genitourinary: Negative. Negative for difficulty urinating, dysuria, flank pain, frequency, hematuria and urgency. Musculoskeletal: Negative. Negative for arthralgias, back pain, myalgias, neck pain and neck stiffness. Skin: Negative. Negative for color change and rash. Neurological: Negative.   Negative for dizziness, syncope, facial asymmetry, speech difficulty, weakness, light-headedness, numbness and headaches. Hematological: Negative. Does not bruise/bleed easily. Psychiatric/Behavioral: Negative. Negative for confusion and self-injury. The patient is not nervous/anxious. Physical Exam   Physical Exam  Vitals signs and nursing note reviewed. Constitutional:       Appearance: He is well-developed. He is not toxic-appearing. HENT:      Head: Normocephalic and atraumatic. Comments: Tenderness to palpation tooth #13 and 14 with surrounding induration, dental caries noted, minimal left facial swelling without palpable abscess. Mouth/Throat:      Pharynx: No posterior oropharyngeal erythema. Eyes:      Conjunctiva/sclera: Conjunctivae normal.      Pupils: Pupils are equal, round, and reactive to light. Neck:      Musculoskeletal: Normal range of motion. Cardiovascular:      Rate and Rhythm: Normal rate and regular rhythm. Heart sounds: Normal heart sounds. No murmur. No friction rub. No gallop. Pulmonary:      Effort: Pulmonary effort is normal. No respiratory distress. Breath sounds: Normal breath sounds. No wheezing or rales. Chest:      Chest wall: No tenderness. Abdominal:      General: Bowel sounds are normal. There is no distension. Palpations: Abdomen is soft. There is no mass. Tenderness: There is no abdominal tenderness. There is no guarding or rebound. Musculoskeletal: Normal range of motion. General: No tenderness or deformity. Skin:     General: Skin is warm. Findings: No rash. Neurological:      Mental Status: He is alert and oriented to person, place, and time. Cranial Nerves: No cranial nerve deficit. Motor: No abnormal muscle tone. Coordination: Coordination normal.      Deep Tendon Reflexes: Reflexes normal.   Psychiatric:         Behavior: Behavior is cooperative.          Diagnostic Study Results     Labs -   I have personally reviewed and interpreted all laboratory results. No results found for this or any previous visit (from the past 24 hour(s)). Radiologic Studies -   I have personally reviewed and interpreted all imaging studies and agree with radiology interpretation and report. No orders to display     CT Results  (Last 48 hours)    None        CXR Results  (Last 48 hours)    None          Medical Decision Making   I reviewed the vital signs, available nursing notes, past medical history, past surgical history, family history and social history. Vital Signs-Reviewed the patient's vital signs. Patient Vitals for the past 24 hrs:   Temp Pulse Resp BP SpO2   01/09/21 1104 98.8 °F (37.1 °C) 78 18 (!) 145/97 96 %       Pulse Oximetry Analysis - 96% on RA    Cardiac Monitor:   Rate: 78 bpm  Rhythm: Normal Sinus Rhythm      Records Reviewed: Nursing Notes, Old Medical Records, Previous electrocardiograms, Previous Radiology Studies and Previous Laboratory Studies    Provider Notes (Medical Decision Making):   Patient presents with dental pain. Stable vitals and exam without obvious abscess that needs drainage. Airway stable and patient speaking in full sentences. No red flags that make PTA, RPA, ludwigs angina concerning. Will tx with dental ball, antibiotics and outpatient analgesics. Given information on dentists and importance of followup and no smoking. ED Course:   I am the first provider for this patient's ED visit today. Initial assessment performed. I discussed presenting problems, concerns and my formulated plan for today's visit with the patient and any available family members at bedside. I encouraged them to ask questions as they arise throughout the visit. TOBACCO COUNSELING:  Upon evaluation, pt expressed that they are a current tobacco user.  For approximately 10 minutes, pt has been counseled on the dangers of smoking and was encouraged to quit as soon as possible in order to decrease further risks to their health. Pt has conveyed their understanding of the risks involved should they continue to use tobacco products. ALCOHOL/SUBSTANCE ABUSE COUNSELING:  Upon evaluation, pt endorsed recent alcohol/illicit drug use. For approximately 7 minutes, pt has been counseled on the dangers of alcohol and illicit drug use on their health, and they were encouraged to quit as soon as possible in order to decrease further risks to their health. Pt has conveyed their understanding of the risks involved should they continue to use these products. I reviewed our electronic medical record system for any past medical records that were available that may contribute to the patient's current condition, the nursing notes and vital signs from today's visit. Dax Edwards MD    ED Orders Placed :  Orders Placed This Encounter    clindamycin (CLEOCIN) capsule 300 mg    dental ball (lidocaine/Benadryl/Cetacaine) mixture    traMADoL (ULTRAM) tablet 50 mg    clindamycin (CLEOCIN) 150 mg capsule    naproxen (NAPROSYN) 500 mg tablet    methylPREDNISolone (Medrol, Roman,) 4 mg tablet    traMADoL (Ultram) 50 mg tablet    dexamethasone (DECADRON) 10 mg/mL Oral 10 mg       ED Medications Administered:  Medications   clindamycin (CLEOCIN) capsule 300 mg (300 mg Oral Given 1/9/21 1149)   dental ball (lidocaine/Benadryl/Cetacaine) mixture ( Mucous Membrane Given 1/9/21 1149)   traMADoL (ULTRAM) tablet 50 mg (50 mg Oral Given 1/9/21 1149)   dexamethasone (DECADRON) 10 mg/mL Oral 10 mg (10 mg Oral Given 1/9/21 1150)        Progress Note:  Patient has been reassessed and reports feeling better and symptoms have improved significantly after ED treatment. Antoine Leo final labs and imaging have been reviewed with him and available family and/or caregiver. They have been counseled regarding his diagnosis.  He verbally conveys understanding and agreement of the signs, symptoms, diagnosis, treatment and prognosis and additionally agrees to follow up as recommended with Dr. Nayeli Mayo MD and/or specialist in 24 - 48 hours. He also agrees with the care-plan we created together and conveys that all of his questions have been answered. I have also put together some discharge instructions for him that include: 1) educational information regarding their diagnosis, 2) how to care for their diagnosis at home, as well a 3) list of reasons why they would want to return to the ED prior to their follow-up appointment should the patient's condition change or symptoms worsen. I have answered all questions to the patient's satisfaction. Strict return precautions given. He both understood and agreed with plan as discussed. Vital signs stable for discharge. Pt very appreciative of care today. Disposition:   DISCHARGE  The pt is ready for discharge. The pt's signs, symptoms, diagnosis, and discharge instructions have been discussed and pt has conveyed their understanding. The pt is to follow up as recommended or return to ER should their symptoms worsen. Plan has been discussed and pt is in agreement. Plan:  1. Return precautions as discussed with patient and available family and/or caregiver. 2.   Discharge Medication List as of 1/9/2021 11:37 AM      START taking these medications    Details   clindamycin (CLEOCIN) 150 mg capsule Take 2 Caps by mouth four (4) times daily for 7 days. , Normal, Disp-56 Cap, R-0      naproxen (NAPROSYN) 500 mg tablet Take 1 Tab by mouth two (2) times daily (with meals). , Normal, Disp-20 Tab, R-0      methylPREDNISolone (Medrol, Roman,) 4 mg tablet Take as prescribed, Normal, Disp-1 Dose Pack, R-0      traMADoL (Ultram) 50 mg tablet Take 1 Tab by mouth every four (4) hours as needed for Pain for up to 3 days.  Max Daily Amount: 300 mg., Normal, Disp-18 Tab, R-0         CONTINUE these medications which have NOT CHANGED    Details   metoprolol tartrate (LOPRESSOR) 25 mg tablet TAKE 1/2 TABLET BY MOUTH TWO TIMES A DAY, Historical Med, R-3      carvedilol (COREG) 3.125 mg tablet TAKE ONE TABLET BY MOUTH EVERY 12 HOURS, Historical Med, R-11      nitroglycerin (NITROSTAT) 0.4 mg SL tablet DISSOLVE 1 TABLET UNDER THE TONGUE AS NEEDED FOR CHEST PAIN, MAY REPEAT EVERY 5 MINUTES FOR 3 TOTAL DOSES., Historical Med, R-3           3. Follow-up Information     Follow up With Specialties Details Why [facility]    [address] [facility] EMERGENCY DEPT Emergency Medicine  As needed, If symptoms worsen [address] [facility]    [address] [facility] EMERGENCY DEPT Emergency Medicine  As needed, If symptoms worsen [address]          Instructed to return to ED if worse  Diagnosis   Clinical Impression:  1. Dental infection    2. Dentalgia    3. Tobacco abuse    4. Left facial swelling        Attestation:  Ibrahima Davidson MD, am the attending of record for this patient. I personally performed the services described in this documentation on this date, 1/9/2021 for patient, Miguel Angel Berry. I have reviewed the chart and verified that the record is accurate and complete.

## 2021-01-09 NOTE — DISCHARGE INSTRUCTIONS
Emergency [address] by TYLER GARDUNO [facility]  [address] [facility], [address] [facility] [address] [facility] [address]  Open M, W, F: 8AM - 5PM and T, Th: 8AM-6PM  Phone: [phone], press 4  $70 for Emergency Care  $60 for first routine care, then pay by sliding scale based upon income. [facility]  [facility] [address]  Phone: [phone]    [employer]  [address] [facility]  Open Monday - Friday 8AM - 4:30 PM  Phone: [phone] of Dentistry Urgent [address] [facility] Dentistry, [address] [facility] [place] [address]   Phone: [phone] to confirm a time for emergency treatment   Pediatrics: [phone]   $75 per tooth, extractions only     [address] Dentistry, [address] [facility], [facility] [address], 2nd Floor, [address] [place] [address] starting at 8:30 AM - 3 PM [address] [place] [address]. [place], [address] [address]   Phone [phone] or [phone]   Hours [id number] (extractions)   Simple tooth extraction: $60 per tooth, $55 per x-ray     [facility] Residents only, over the age of 25  Phone: [phone]. Leave message saying you need an appointment to register. Hours: Tuesday Evenings     [facility] (in [place])   [facility] AT [place] only   Phone: [phone], leave message saying you need an appointment to register   Hours: Wed 6-9p     Non-Urgent [place] Rough     A[address] [facility] [address] at [address]  [facility] [place]   Dental Clinic: [phone]   Oral Surgery Clinic: [phone]

## 2021-03-27 ENCOUNTER — HOSPITAL ENCOUNTER (EMERGENCY)
Age: 51
Discharge: HOME | End: 2021-03-27
Attending: EMERGENCY MEDICINE
Payer: COMMERCIAL

## 2021-03-27 VITALS
TEMPERATURE: 99.3 F | BODY MASS INDEX: 26.11 KG/M2 | HEART RATE: 69 BPM | HEIGHT: 75 IN | DIASTOLIC BLOOD PRESSURE: 78 MMHG | RESPIRATION RATE: 16 BRPM | WEIGHT: 210 LBS | OXYGEN SATURATION: 96 % | SYSTOLIC BLOOD PRESSURE: 123 MMHG

## 2021-03-27 DIAGNOSIS — K05.6 PERIODONTAL DISEASE: Primary | ICD-10-CM

## 2021-03-27 PROCEDURE — 74011000250 HC RX REV CODE- 250: Performed by: NURSE PRACTITIONER

## 2021-03-27 PROCEDURE — 99283 EMERGENCY DEPT VISIT LOW MDM: CPT

## 2021-03-27 PROCEDURE — 74011250637 HC RX REV CODE- 250/637: Performed by: NURSE PRACTITIONER

## 2021-03-27 RX ORDER — HYDROCODONE BITARTRATE AND ACETAMINOPHEN 5; 325 MG/1; MG/1
1 TABLET ORAL
Status: COMPLETED | OUTPATIENT
Start: 2021-03-27 | End: 2021-03-27

## 2021-03-27 RX ORDER — CLINDAMYCIN HYDROCHLORIDE 300 MG/1
300 CAPSULE ORAL 2 TIMES DAILY
Qty: 14 CAP | Refills: 0 | Status: SHIPPED | OUTPATIENT
Start: 2021-03-27 | End: 2021-10-15 | Stop reason: SDUPTHER

## 2021-03-27 RX ORDER — DICLOFENAC SODIUM 75 MG/1
75 TABLET, DELAYED RELEASE ORAL 2 TIMES DAILY
Qty: 14 TAB | Refills: 0 | Status: SHIPPED | OUTPATIENT
Start: 2021-03-27

## 2021-03-27 RX ADMIN — HYDROCODONE BITARTRATE AND ACETAMINOPHEN 1 TABLET: 5; 325 TABLET ORAL at 14:30

## 2021-03-27 RX ADMIN — DIPHENHYDRAMINE HYDROCHLORIDE: 12.5 LIQUID ORAL at 14:30

## 2021-03-27 NOTE — ED NOTES
Pt given printed discharge instructions and 2 script(s). Pt verbalized understanding of instructions and script(s). Pt verbalized importance of following up with dentist.  Pt alert and oriented, in no acute distress, ambulatory with family member.

## 2021-03-27 NOTE — ED PROVIDER NOTES
EMERGENCY DEPARTMENT HISTORY AND PHYSICAL EXAM    Date: 3/27/2021  Patient Name: Tabitha Aguilar    History of Presenting Illness     Chief Complaint   Patient presents with    Dental Pain         History Provided By: Patient    HPI: Tabitha Aguilar is a 45 y.o. male with a PMH of No significant past medical history who presents with toe pain. Onset yesterday. Pain located to right upper gum. Patient has a history of periodontal disease and has a follow-up appointment with [facility] for dental extraction next week. Patient reports taking BC powder with no relief. Denies fever, chills. States he is beginning to have mild facial swelling. PCP: Evelia, MD Georgina    Current Outpatient Medications   Medication Sig Dispense Refill    diclofenac EC (VOLTAREN) 75 mg EC tablet Take 1 Tab by mouth two (2) times a day. 14 Tab 0    clindamycin (CLEOCIN) 300 mg capsule Take 1 Cap by mouth two (2) times a day. 14 Cap 0    metoprolol tartrate (LOPRESSOR) 25 mg tablet TAKE 1/2 TABLET BY MOUTH TWO TIMES A DAY  3    nitroglycerin (NITROSTAT) 0.4 mg SL tablet DISSOLVE 1 TABLET UNDER THE TONGUE AS NEEDED FOR CHEST PAIN, MAY REPEAT EVERY 5 MINUTES FOR 3 TOTAL DOSES. 3    carvedilol (COREG) 3.125 mg tablet TAKE ONE TABLET BY MOUTH EVERY 12 HOURS  11       Past History     Past Medical History:  Past Medical History:   Diagnosis Date    Ill-defined condition     heart attack    Other ill-defined conditions(799.89)     hx of back pain       Past Surgical History:  Past Surgical History:   Procedure Laterality Date    HX OTHER SURGICAL      3rd finger of left hand       Family History:  History reviewed. No pertinent family history.     Social History:  Social History     Tobacco Use    Smoking status: Current Every Day Smoker    Smokeless tobacco: Never Used   Substance Use Topics    Alcohol use: No    Drug use: Yes     Frequency: 7.0 times per week     Types: Marijuana, Heroin     Comment: used 5 hours ago Allergies: Allergies   Allergen Reactions    Penicillins Anaphylaxis         Review of Systems   Review of Systems   Constitutional: Negative for chills and fever. HENT: Positive for dental problem. Negative for congestion, drooling, ear discharge, ear pain, facial swelling, mouth sores, rhinorrhea and sore throat. Eyes: Negative for pain and discharge. Respiratory: Negative for cough and shortness of breath. Cardiovascular: Negative for chest pain and leg swelling. Gastrointestinal: Negative for abdominal pain, nausea and vomiting. Genitourinary: Negative for urgency. Musculoskeletal: Negative for arthralgias. Skin: Negative for wound. Neurological: Negative for numbness and headaches. All other systems reviewed and are negative. Physical Exam     Vitals:    03/27/21 1327 03/27/21 1400   BP: 137/84 123/78   Pulse: 68 69   Resp: 16 16   Temp: 99.3 °F (37.4 °C)    SpO2: 96% 96%   Weight: 95.3 kg (210 lb)    Height: 6' 3\" (1.905 m)      Physical Exam  Vitals signs and nursing note reviewed. Constitutional:       General: He is not in acute distress. Appearance: He is well-developed. He is not ill-appearing. HENT:      Head: Normocephalic and atraumatic. Right Ear: Tympanic membrane, ear canal and external ear normal.      Left Ear: Tympanic membrane, ear canal and external ear normal.      Nose: Nose normal.      Mouth/Throat:      Mouth: Mucous membranes are moist.      Dentition: Abnormal dentition ( R upper gum redness, swelling and tenderness. fractured teeth noted throughout ). Gingival swelling present. No dental abscesses. Pharynx: Oropharynx is clear. No oropharyngeal exudate or posterior oropharyngeal erythema. Eyes:      Extraocular Movements: Extraocular movements intact. Conjunctiva/sclera: Conjunctivae normal.      Pupils: Pupils are equal, round, and reactive to light. Neck:      Musculoskeletal: Normal range of motion and neck supple. Cardiovascular:      Rate and Rhythm: Normal rate and regular rhythm. Pulses: Normal pulses. Heart sounds: Normal heart sounds. Pulmonary:      Effort: Pulmonary effort is normal.      Breath sounds: Normal breath sounds. Neurological:      Mental Status: He is alert and oriented to person, place, and time. Psychiatric:         Thought Content: Thought content normal.           Diagnostic Study Results     Labs -   No results found for this or any previous visit (from the past 12 hour(s)). Radiologic Studies -   No orders to display     CT Results  (Last 48 hours)    None        CXR Results  (Last 48 hours)    None            Medical Decision Making   I am the first provider for this patient. I reviewed the vital signs, available nursing notes, past medical history, past surgical history, family history and social history. Vital Signs-Reviewed the patient's vital signs. Records Reviewed: Nursing Notes and Old Medical Records    Provider Notes (Medical Decision Making):      44 yo M with dental pain with ddx concerning for pulpitis, dental carries, tooth decay, tooth avulsion, dental abscess. No visible abscess on exam that can be drained. Airway stable and patient speaking in full sentences. There is no trismus, the uvula is midline, no tongue swelling, there is no meningismus and no gross neurologic deficits observed. No neck tenderness or pain. Unlikely PTA, RPA, ludwigs angina. Plan to treat with pen VK, norco and give dental balls in the ER          Disposition:  Discharge   DISCHARGE NOTE:   2:18 PM        Care plan outlined and precautions discussed. Patient has no new complaints, changes, or physical findings. . All of pt's questions and concerns were addressed. Patient was instructed and agrees to follow up with dentist , as well as to return to the ED upon further deterioration. Patient is ready to go home.     Follow-up Information     Follow up With Specialties Details Why Contact [facility]   as scheduled           Discharge Medication List as of 3/27/2021  2:21 PM      START taking these medications    Details   diclofenac EC (VOLTAREN) 75 mg EC tablet Take 1 Tab by mouth two (2) times a day., Normal, Disp-14 Tab, R-0      clindamycin (CLEOCIN) 300 mg capsule Take 1 Cap by mouth two (2) times a day., Normal, Disp-14 Cap, R-0         CONTINUE these medications which have NOT CHANGED    Details   metoprolol tartrate (LOPRESSOR) 25 mg tablet TAKE 1/2 TABLET BY MOUTH TWO TIMES A DAY, Historical Med, R-3      nitroglycerin (NITROSTAT) 0.4 mg SL tablet DISSOLVE 1 TABLET UNDER THE TONGUE AS NEEDED FOR CHEST PAIN, MAY REPEAT EVERY 5 MINUTES FOR 3 TOTAL DOSES., Historical Med, R-3      carvedilol (COREG) 3.125 mg tablet TAKE ONE TABLET BY MOUTH EVERY 12 HOURS, Historical Med, R-11             Procedures:  Procedures    Please note that this dictation was completed with Dragon, computer voice recognition software. Quite often unanticipated grammatical, syntax, homophones, and other interpretive errors are inadvertently transcribed by the computer software. Please disregard these errors. Additionally, please excuse any errors that have escaped final proofreading. Diagnosis     Clinical Impression:   1.  Periodontal disease

## 2021-07-30 NOTE — ED NOTES
Emergency Department Nursing Plan of Care       The Nursing Plan of Care is developed from the Nursing assessment and Emergency Department Attending provider initial evaluation. The plan of care may be reviewed in the ED Provider note.     The Plan of Care was developed with the following considerations:   Patient / Family readiness to learn indicated by:verbalized understanding  Persons(s) to be included in education: patient  Barriers to Learning/Limitations:probable, pt with repeat visits to ED for dental issues    Signed     Дмитрий Del Angel RN    3/27/2021   2:36 PM no

## 2021-10-15 ENCOUNTER — HOSPITAL ENCOUNTER (EMERGENCY)
Age: 51
Discharge: HOME | End: 2021-10-15
Attending: EMERGENCY MEDICINE
Payer: COMMERCIAL

## 2021-10-15 VITALS
SYSTOLIC BLOOD PRESSURE: 132 MMHG | WEIGHT: 215 LBS | HEART RATE: 98 BPM | DIASTOLIC BLOOD PRESSURE: 99 MMHG | OXYGEN SATURATION: 99 % | RESPIRATION RATE: 20 BRPM | TEMPERATURE: 98.5 F | HEIGHT: 75 IN | BODY MASS INDEX: 26.73 KG/M2

## 2021-10-15 DIAGNOSIS — K04.7 DENTAL ABSCESS: Primary | ICD-10-CM

## 2021-10-15 PROCEDURE — 99283 EMERGENCY DEPT VISIT LOW MDM: CPT

## 2021-10-15 PROCEDURE — 74011000250 HC RX REV CODE- 250: Performed by: NURSE PRACTITIONER

## 2021-10-15 PROCEDURE — 74011250637 HC RX REV CODE- 250/637: Performed by: NURSE PRACTITIONER

## 2021-10-15 RX ORDER — CLINDAMYCIN HYDROCHLORIDE 300 MG/1
300 CAPSULE ORAL 2 TIMES DAILY
Qty: 14 CAPSULE | Refills: 0 | Status: SHIPPED | OUTPATIENT
Start: 2021-10-15

## 2021-10-15 RX ORDER — TRAMADOL HYDROCHLORIDE 50 MG/1
50 TABLET ORAL
Qty: 6 TABLET | Refills: 0 | Status: SHIPPED | OUTPATIENT
Start: 2021-10-15 | End: 2021-10-20

## 2021-10-15 RX ADMIN — DIPHENHYDRAMINE HYDROCHLORIDE: 12.5 LIQUID ORAL at 18:54

## 2021-10-15 NOTE — ED TRIAGE NOTES
Patient states having dental pain, top on both sides. Patient states he had 2 antony teeth that he feels \" are infected\".

## 2021-10-15 NOTE — Clinical Note
[facility] EMERGENCY DEPT  [address] [facility] [id number] [phone]    Work/School Note    Date: 10/15/2021    To Whom It May concern:    Jose Elias Victoria was seen and treated today in the emergency room by the following provider(s):  Attending Provider: Massiel Day MD  Nurse Practitioner: Kathy Page NP. Jose Elias Victoria is excused from work/school on 10/15/21 and 10/16/21. He is medically clear to return to work/school on 10/17/2021.        Sincerely,          Jorge Hoover NP

## 2021-10-15 NOTE — ED PROVIDER NOTES
EMERGENCY DEPARTMENT HISTORY AND PHYSICAL EXAM    Date: 10/15/2021  Patient Name: Tabitha Aguilar    History of Presenting Illness     Chief Complaint   Patient presents with    Dental Pain         History Provided By: Patient    HPI: Tabitha Aguilar is a 45 y.o. male with a PMH of MI  who presents with dental pain. Reports tooth extraction 2 weeks ago but stats there is a tooth remaining. He had procedure completed at [facility]. Denies fever, chills, facial swelling. Reports a lot of pain and has not taken anything for pain. PCP: Georgina Altamirano MD    Current Outpatient Medications   Medication Sig Dispense Refill    clindamycin (CLEOCIN) 300 mg capsule Take 1 Capsule by mouth two (2) times a day. 14 Capsule 0    traMADoL (Ultram) 50 mg tablet Take 1 Tablet by mouth every six (6) hours as needed for Pain for up to 5 days. Max Daily Amount: 200 mg. 6 Tablet 0    diclofenac EC (VOLTAREN) 75 mg EC tablet Take 1 Tab by mouth two (2) times a day. 14 Tab 0    metoprolol tartrate (LOPRESSOR) 25 mg tablet TAKE 1/2 TABLET BY MOUTH TWO TIMES A DAY  3    nitroglycerin (NITROSTAT) 0.4 mg SL tablet DISSOLVE 1 TABLET UNDER THE TONGUE AS NEEDED FOR CHEST PAIN, MAY REPEAT EVERY 5 MINUTES FOR 3 TOTAL DOSES. 3    carvedilol (COREG) 3.125 mg tablet TAKE ONE TABLET BY MOUTH EVERY 12 HOURS  11       Past History     Past Medical History:  Past Medical History:   Diagnosis Date    Ill-defined condition     heart attack    Other ill-defined conditions(799.89)     hx of back pain       Past Surgical History:  Past Surgical History:   Procedure Laterality Date    HX OTHER SURGICAL      3rd finger of left hand       Family History:  History reviewed. No pertinent family history.     Social History:  Social History     Tobacco Use    Smoking status: Current Every Day Smoker    Smokeless tobacco: Never Used   Substance Use Topics    Alcohol use: No    Drug use: Yes     Frequency: 7.0 times per week     Types: Marijuana, Heroin     Comment: used 5 hours ago       Allergies: Allergies   Allergen Reactions    Penicillins Anaphylaxis         Review of Systems   Review of Systems   Constitutional: Negative for chills and fever. HENT: Positive for dental problem. Negative for congestion, drooling, ear discharge, ear pain, facial swelling, mouth sores, rhinorrhea and sore throat. Eyes: Negative for pain and discharge. Respiratory: Negative for cough and shortness of breath. Cardiovascular: Negative for chest pain and leg swelling. Gastrointestinal: Negative for abdominal pain, nausea and vomiting. Musculoskeletal: Negative for arthralgias. Skin: Negative for wound. Neurological: Negative for numbness and headaches. All other systems reviewed and are negative. Physical Exam     Vitals:    10/15/21 1705   BP: (!) 132/99   Pulse: 98   Resp: 20   Temp: 98.5 °F (36.9 °C)   SpO2: 99%   Weight: 97.5 kg (215 lb)   Height: 6' 3\" (1.905 m)     Physical Exam  Vitals and nursing note reviewed. Constitutional:       General: He is not in acute distress. Appearance: He is well-developed. He is not ill-appearing. HENT:      Head: Normocephalic and atraumatic. Right Ear: Tympanic membrane, ear canal and external ear normal.      Left Ear: Tympanic membrane, ear canal and external ear normal.      Nose: Nose normal.      Mouth/Throat:      Mouth: Mucous membranes are moist.      Dentition: Abnormal dentition (gum erythematous and swelling with absent teeth  and partial tooth extraction in R upper gum ). Pharynx: Oropharynx is clear. No oropharyngeal exudate or posterior oropharyngeal erythema. Eyes:      Extraocular Movements: Extraocular movements intact. Conjunctiva/sclera: Conjunctivae normal.      Pupils: Pupils are equal, round, and reactive to light. Cardiovascular:      Rate and Rhythm: Normal rate and regular rhythm. Pulses: Normal pulses. Heart sounds: Normal heart sounds. Pulmonary:      Effort: Pulmonary effort is normal.      Breath sounds: Normal breath sounds. Musculoskeletal:      Cervical back: Normal range of motion and neck supple. Neurological:      Mental Status: He is alert and oriented to person, place, and time. Psychiatric:         Thought Content: Thought content normal.           Diagnostic Study Results     Labs -   No results found for this or any previous visit (from the past 12 hour(s)). Radiologic Studies -   No orders to display     CT Results  (Last 48 hours)    None        CXR Results  (Last 48 hours)    None            Medical Decision Making   I am the first provider for this patient. I reviewed the vital signs, available nursing notes, past medical history, past surgical history, family history and social history. Vital Signs-Reviewed the patient's vital signs. Records Reviewed: Nursing Notes and Old Medical Records    Provider Notes (Medical Decision Making):             Disposition:  Discharge     DISCHARGE NOTE:         Care plan outlined and precautions discussed. Patient has no new complaints, changes, or physical findings. All of pt's questions and concerns were addressed. Patient was instructed and agrees to follow up with PCP, as well as to return to the ED upon further deterioration. Patient is ready to go home. Follow-up Information     Follow up With Specialties Details Why [address] [facility]  Call  As needed, If symptoms worsen [address]  [phone]          Current Discharge Medication List      START taking these medications    Details   traMADoL (Ultram) 50 mg tablet Take 1 Tablet by mouth every six (6) hours as needed for Pain for up to 5 days. Max Daily Amount: 200 mg.   Qty: 6 Tablet, Refills: 0  Start date: 10/15/2021, End date: 10/20/2021    Associated Diagnoses: Dental abscess         CONTINUE these medications which have CHANGED    Details   clindamycin (CLEOCIN) 300 mg capsule Take 1 Capsule by mouth two (2) times a day. Qty: 14 Capsule, Refills: 0  Start date: 10/15/2021             Procedures:  Procedures    Please note that this dictation was completed with Dragon, computer voice recognition software. Quite often unanticipated grammatical, syntax, homophones, and other interpretive errors are inadvertently transcribed by the computer software. Please disregard these errors. Additionally, please excuse any errors that have escaped final proofreading. Diagnosis     Clinical Impression:   1.  Dental abscess

## 2021-10-15 NOTE — DISCHARGE INSTRUCTIONS
It was a pleasure taking care of you at [facility] Emergency Department today. We know that when you come to [facility], you are entrusting us with your health, comfort, and safety. Our physicians and nurses honor that trust, and we truly appreciate the opportunity to care for you and your loved ones. We also value our feedback. If you receive a survey about your Emergency Department experience today, please fill it out. We care about our patients' feedback, and we listen to what you have to say. Thank you! PMD

## 2021-10-16 NOTE — ED NOTES
Patient here with c/o dental pain. Patient states symptoms for several days, states he has had history of multiple dental surgeries, states that he recently had his last two teeth pulled from the top of his mouth but reports pain and swelling since then. Patient states that he called his dentist back to get to be seen again however he states that they were not able to see him again until the end of November, a month and a half away. Patient denies fevers. Patient states \"it even hurts to eat apple sauce. \"            Emergency Department Nursing Plan of Care       The Nursing Plan of Care is developed from the Nursing assessment and Emergency Department Attending provider initial evaluation. The plan of care may be reviewed in the ED Provider note.     The Plan of Care was developed with the following considerations:   Patient / Family readiness to learn indicated by:verbalized understanding  Persons(s) to be included in education: patient  Barriers to Learning/Limitations:No    Signed     Anay Leal RN    10/15/2021   8:07 PM

## 2021-11-12 ENCOUNTER — HOSPITAL ENCOUNTER (EMERGENCY)
Age: 51
Discharge: HOME | End: 2021-11-12
Attending: EMERGENCY MEDICINE
Payer: COMMERCIAL

## 2021-11-12 ENCOUNTER — HOSPITAL ENCOUNTER (INPATIENT)
Age: 51
LOS: 7 days | Discharge: HOME | End: 2021-11-19
Attending: INTERNAL MEDICINE | Admitting: INTERNAL MEDICINE
Payer: COMMERCIAL

## 2021-11-12 ENCOUNTER — APPOINTMENT (OUTPATIENT)
Dept: GENERAL RADIOLOGY | Age: 51
End: 2021-11-12
Attending: EMERGENCY MEDICINE
Payer: COMMERCIAL

## 2021-11-12 ENCOUNTER — DOCUMENTATION ONLY (OUTPATIENT)
Dept: CARDIOLOGY CLINIC | Age: 51
End: 2021-11-12

## 2021-11-12 VITALS
TEMPERATURE: 98 F | BODY MASS INDEX: 27.41 KG/M2 | HEIGHT: 75 IN | RESPIRATION RATE: 17 BRPM | HEART RATE: 54 BPM | WEIGHT: 220.46 LBS | SYSTOLIC BLOOD PRESSURE: 129 MMHG | OXYGEN SATURATION: 97 % | DIASTOLIC BLOOD PRESSURE: 83 MMHG

## 2021-11-12 DIAGNOSIS — R07.9 CHEST PAIN, UNSPECIFIED TYPE: ICD-10-CM

## 2021-11-12 DIAGNOSIS — I24.9 ACS (ACUTE CORONARY SYNDROME) (HCC): ICD-10-CM

## 2021-11-12 DIAGNOSIS — F11.93 HEROIN WITHDRAWAL (HCC): ICD-10-CM

## 2021-11-12 DIAGNOSIS — Z98.61 S/P PTCA (PERCUTANEOUS TRANSLUMINAL CORONARY ANGIOPLASTY): ICD-10-CM

## 2021-11-12 DIAGNOSIS — I25.110 CORONARY ARTERY DISEASE INVOLVING NATIVE CORONARY ARTERY OF NATIVE HEART WITH UNSTABLE ANGINA PECTORIS (HCC): ICD-10-CM

## 2021-11-12 DIAGNOSIS — F19.10 POLYSUBSTANCE ABUSE (HCC): ICD-10-CM

## 2021-11-12 DIAGNOSIS — I24.9 ACS (ACUTE CORONARY SYNDROME) (HCC): Primary | ICD-10-CM

## 2021-11-12 DIAGNOSIS — Z91.14 NONCOMPLIANCE WITH MEDICATION REGIMEN: ICD-10-CM

## 2021-11-12 DIAGNOSIS — E78.2 MIXED HYPERLIPIDEMIA: ICD-10-CM

## 2021-11-12 LAB
AMORPH CRY URNS QL MICRO: ABNORMAL
AMPHET UR QL SCN: NEGATIVE
ANION GAP SERPL CALC-SCNC: 9 MMOL/L (ref 5–15)
APPEARANCE UR: ABNORMAL
APTT PPP: 21 SEC (ref 22.1–31)
BACTERIA URNS QL MICRO: NEGATIVE /HPF
BARBITURATES UR QL SCN: NEGATIVE
BASOPHILS # BLD: 0 K/UL (ref 0–0.1)
BASOPHILS # BLD: 0 K/UL (ref 0–0.1)
BASOPHILS NFR BLD: 1 % (ref 0–1)
BASOPHILS NFR BLD: 1 % (ref 0–1)
BENZODIAZ UR QL: NEGATIVE
BILIRUB UR QL: NEGATIVE
BUN SERPL-MCNC: 10 MG/DL (ref 6–20)
BUN/CREAT SERPL: 12 (ref 12–20)
CALCIUM SERPL-MCNC: 10 MG/DL (ref 8.5–10.1)
CANNABINOIDS UR QL SCN: NEGATIVE
CHLORIDE SERPL-SCNC: 103 MMOL/L (ref 97–108)
CO2 SERPL-SCNC: 29 MMOL/L (ref 21–32)
COCAINE UR QL SCN: POSITIVE
COLOR UR: ABNORMAL
CREAT SERPL-MCNC: 0.85 MG/DL (ref 0.7–1.3)
DIFFERENTIAL METHOD BLD: ABNORMAL
DIFFERENTIAL METHOD BLD: ABNORMAL
DRUG SCRN COMMENT,DRGCM: ABNORMAL
EOSINOPHIL # BLD: 0.4 K/UL (ref 0–0.4)
EOSINOPHIL # BLD: 0.5 K/UL (ref 0–0.4)
EOSINOPHIL NFR BLD: 7 % (ref 0–7)
EOSINOPHIL NFR BLD: 7 % (ref 0–7)
EPITH CASTS URNS QL MICRO: ABNORMAL /LPF
ERYTHROCYTE [DISTWIDTH] IN BLOOD BY AUTOMATED COUNT: 15 % (ref 11.5–14.5)
ERYTHROCYTE [DISTWIDTH] IN BLOOD BY AUTOMATED COUNT: 15.2 % (ref 11.5–14.5)
FLUAV RNA SPEC QL NAA+PROBE: NOT DETECTED
FLUBV RNA SPEC QL NAA+PROBE: NOT DETECTED
GLUCOSE SERPL-MCNC: 77 MG/DL (ref 65–100)
GLUCOSE UR STRIP.AUTO-MCNC: NEGATIVE MG/DL
HCT VFR BLD AUTO: 40 % (ref 36.6–50.3)
HCT VFR BLD AUTO: 42.6 % (ref 36.6–50.3)
HGB BLD-MCNC: 12.3 G/DL (ref 12.1–17)
HGB BLD-MCNC: 13.1 G/DL (ref 12.1–17)
HGB UR QL STRIP: NEGATIVE
IMM GRANULOCYTES # BLD AUTO: 0 K/UL (ref 0–0.04)
IMM GRANULOCYTES # BLD AUTO: 0 K/UL (ref 0–0.04)
IMM GRANULOCYTES NFR BLD AUTO: 0 % (ref 0–0.5)
IMM GRANULOCYTES NFR BLD AUTO: 0 % (ref 0–0.5)
INR PPP: 1 (ref 0.9–1.1)
KETONES UR QL STRIP.AUTO: NEGATIVE MG/DL
LEUKOCYTE ESTERASE UR QL STRIP.AUTO: NEGATIVE
LYMPHOCYTES # BLD: 2 K/UL (ref 0.8–3.5)
LYMPHOCYTES # BLD: 2.7 K/UL (ref 0.8–3.5)
LYMPHOCYTES NFR BLD: 39 % (ref 12–49)
LYMPHOCYTES NFR BLD: 41 % (ref 12–49)
MCH RBC QN AUTO: 23.7 PG (ref 26–34)
MCH RBC QN AUTO: 24.4 PG (ref 26–34)
MCHC RBC AUTO-ENTMCNC: 30.8 G/DL (ref 30–36.5)
MCHC RBC AUTO-ENTMCNC: 30.8 G/DL (ref 30–36.5)
MCV RBC AUTO: 77.2 FL (ref 80–99)
MCV RBC AUTO: 79.2 FL (ref 80–99)
METHADONE UR QL: NEGATIVE
MONOCYTES # BLD: 0.5 K/UL (ref 0–1)
MONOCYTES # BLD: 0.7 K/UL (ref 0–1)
MONOCYTES NFR BLD: 10 % (ref 5–13)
MONOCYTES NFR BLD: 10 % (ref 5–13)
NEUTS SEG # BLD: 2.2 K/UL (ref 1.8–8)
NEUTS SEG # BLD: 2.6 K/UL (ref 1.8–8)
NEUTS SEG NFR BLD: 41 % (ref 32–75)
NEUTS SEG NFR BLD: 43 % (ref 32–75)
NITRITE UR QL STRIP.AUTO: NEGATIVE
NRBC # BLD: 0 K/UL (ref 0–0.01)
NRBC # BLD: 0 K/UL (ref 0–0.01)
NRBC BLD-RTO: 0 PER 100 WBC
NRBC BLD-RTO: 0 PER 100 WBC
OPIATES UR QL: POSITIVE
PCP UR QL: NEGATIVE
PH UR STRIP: 7.5 [PH] (ref 5–8)
PLATELET # BLD AUTO: 184 K/UL (ref 150–400)
PLATELET # BLD AUTO: 234 K/UL (ref 150–400)
PMV BLD AUTO: 10.9 FL (ref 8.9–12.9)
PMV BLD AUTO: 11 FL (ref 8.9–12.9)
POTASSIUM SERPL-SCNC: 3.4 MMOL/L (ref 3.5–5.1)
PROT UR STRIP-MCNC: NEGATIVE MG/DL
PROTHROMBIN TIME: 9.9 SEC (ref 9–11.1)
RBC # BLD AUTO: 5.05 M/UL (ref 4.1–5.7)
RBC # BLD AUTO: 5.52 M/UL (ref 4.1–5.7)
RBC #/AREA URNS HPF: ABNORMAL /HPF (ref 0–5)
SARS-COV-2, COV2: NOT DETECTED
SODIUM SERPL-SCNC: 141 MMOL/L (ref 136–145)
SP GR UR REFRACTOMETRY: 1.02 (ref 1–1.03)
THERAPEUTIC RANGE,PTTT: ABNORMAL SECS (ref 58–77)
TROPONIN-HIGH SENSITIVITY: 14 NG/L (ref 0–76)
TROPONIN-HIGH SENSITIVITY: 295 NG/L (ref 0–76)
TROPONIN-HIGH SENSITIVITY: 38 NG/L (ref 0–76)
UA: UC IF INDICATED,UAUC: ABNORMAL
UROBILINOGEN UR QL STRIP.AUTO: 1 EU/DL (ref 0.2–1)
WBC # BLD AUTO: 5.1 K/UL (ref 4.1–11.1)
WBC # BLD AUTO: 6.5 K/UL (ref 4.1–11.1)
WBC URNS QL MICRO: ABNORMAL /HPF (ref 0–4)

## 2021-11-12 PROCEDURE — 74011000250 HC RX REV CODE- 250: Performed by: EMERGENCY MEDICINE

## 2021-11-12 PROCEDURE — 74011250637 HC RX REV CODE- 250/637: Performed by: EMERGENCY MEDICINE

## 2021-11-12 PROCEDURE — 85025 COMPLETE CBC W/AUTO DIFF WBC: CPT

## 2021-11-12 PROCEDURE — 96365 THER/PROPH/DIAG IV INF INIT: CPT

## 2021-11-12 PROCEDURE — 96375 TX/PRO/DX INJ NEW DRUG ADDON: CPT

## 2021-11-12 PROCEDURE — 87636 SARSCOV2 & INF A&B AMP PRB: CPT

## 2021-11-12 PROCEDURE — 93005 ELECTROCARDIOGRAM TRACING: CPT

## 2021-11-12 PROCEDURE — 80307 DRUG TEST PRSMV CHEM ANLYZR: CPT

## 2021-11-12 PROCEDURE — 99285 EMERGENCY DEPT VISIT HI MDM: CPT

## 2021-11-12 PROCEDURE — 85610 PROTHROMBIN TIME: CPT

## 2021-11-12 PROCEDURE — 71045 X-RAY EXAM CHEST 1 VIEW: CPT

## 2021-11-12 PROCEDURE — 74011250636 HC RX REV CODE- 250/636: Performed by: EMERGENCY MEDICINE

## 2021-11-12 PROCEDURE — 84484 ASSAY OF TROPONIN QUANT: CPT

## 2021-11-12 PROCEDURE — 96366 THER/PROPH/DIAG IV INF ADDON: CPT

## 2021-11-12 PROCEDURE — 74011250636 HC RX REV CODE- 250/636: Performed by: INTERNAL MEDICINE

## 2021-11-12 PROCEDURE — 36415 COLL VENOUS BLD VENIPUNCTURE: CPT

## 2021-11-12 PROCEDURE — 80048 BASIC METABOLIC PNL TOTAL CA: CPT

## 2021-11-12 PROCEDURE — 85730 THROMBOPLASTIN TIME PARTIAL: CPT

## 2021-11-12 PROCEDURE — 96376 TX/PRO/DX INJ SAME DRUG ADON: CPT

## 2021-11-12 PROCEDURE — 74011250637 HC RX REV CODE- 250/637: Performed by: INTERNAL MEDICINE

## 2021-11-12 PROCEDURE — 65660000001 HC RM ICU INTERMED STEPDOWN

## 2021-11-12 PROCEDURE — 81001 URINALYSIS AUTO W/SCOPE: CPT

## 2021-11-12 RX ORDER — MORPHINE SULFATE 2 MG/ML
2 INJECTION, SOLUTION INTRAMUSCULAR; INTRAVENOUS
Status: COMPLETED | OUTPATIENT
Start: 2021-11-12 | End: 2021-11-12

## 2021-11-12 RX ORDER — SODIUM CHLORIDE 0.9 % (FLUSH) 0.9 %
5-40 SYRINGE (ML) INJECTION AS NEEDED
Status: DISCONTINUED | OUTPATIENT
Start: 2021-11-12 | End: 2021-11-19 | Stop reason: HOSPADM

## 2021-11-12 RX ORDER — ATORVASTATIN CALCIUM 40 MG/1
40 TABLET, FILM COATED ORAL
Status: DISCONTINUED | OUTPATIENT
Start: 2021-11-12 | End: 2021-11-19 | Stop reason: HOSPADM

## 2021-11-12 RX ORDER — CLOPIDOGREL BISULFATE 75 MG/1
75 TABLET ORAL DAILY
Status: DISCONTINUED | OUTPATIENT
Start: 2021-11-13 | End: 2021-11-12

## 2021-11-12 RX ORDER — ENOXAPARIN SODIUM 100 MG/ML
1 INJECTION SUBCUTANEOUS EVERY 12 HOURS
Status: COMPLETED | OUTPATIENT
Start: 2021-11-12 | End: 2021-11-13

## 2021-11-12 RX ORDER — ASPIRIN 325 MG
325 TABLET ORAL DAILY
Status: DISCONTINUED | OUTPATIENT
Start: 2021-11-13 | End: 2021-11-12 | Stop reason: SDUPTHER

## 2021-11-12 RX ORDER — SODIUM CHLORIDE 0.9 % (FLUSH) 0.9 %
5-40 SYRINGE (ML) INJECTION EVERY 8 HOURS
Status: DISCONTINUED | OUTPATIENT
Start: 2021-11-12 | End: 2021-11-19 | Stop reason: HOSPADM

## 2021-11-12 RX ORDER — CLOPIDOGREL BISULFATE 75 MG/1
75 TABLET ORAL DAILY
Status: DISCONTINUED | OUTPATIENT
Start: 2021-11-13 | End: 2021-11-18

## 2021-11-12 RX ORDER — HEPARIN SODIUM 1000 [USP'U]/ML
4000 INJECTION, SOLUTION INTRAVENOUS; SUBCUTANEOUS ONCE
Status: COMPLETED | OUTPATIENT
Start: 2021-11-12 | End: 2021-11-12

## 2021-11-12 RX ORDER — NITROGLYCERIN 20 MG/100ML
5-20 INJECTION INTRAVENOUS
Status: DISCONTINUED | OUTPATIENT
Start: 2021-11-12 | End: 2021-11-12 | Stop reason: HOSPADM

## 2021-11-12 RX ORDER — GUAIFENESIN 100 MG/5ML
81 LIQUID (ML) ORAL DAILY
Status: DISCONTINUED | OUTPATIENT
Start: 2021-11-13 | End: 2021-11-19 | Stop reason: HOSPADM

## 2021-11-12 RX ORDER — NITROGLYCERIN 0.4 MG/1
0.4 TABLET SUBLINGUAL AS NEEDED
Status: DISCONTINUED | OUTPATIENT
Start: 2021-11-12 | End: 2021-11-13 | Stop reason: SDUPTHER

## 2021-11-12 RX ORDER — HEPARIN SODIUM 5000 [USP'U]/ML
5000 INJECTION, SOLUTION INTRAVENOUS; SUBCUTANEOUS
Status: DISCONTINUED | OUTPATIENT
Start: 2021-11-12 | End: 2021-11-12 | Stop reason: ALTCHOICE

## 2021-11-12 RX ORDER — HEPARIN SODIUM 10000 [USP'U]/100ML
12-25 INJECTION, SOLUTION INTRAVENOUS
Status: DISCONTINUED | OUTPATIENT
Start: 2021-11-12 | End: 2021-11-12 | Stop reason: SDUPTHER

## 2021-11-12 RX ORDER — CARVEDILOL 3.12 MG/1
3.12 TABLET ORAL 2 TIMES DAILY WITH MEALS
Status: DISCONTINUED | OUTPATIENT
Start: 2021-11-13 | End: 2021-11-14

## 2021-11-12 RX ORDER — HEPARIN SODIUM 10000 [USP'U]/100ML
10-25 INJECTION, SOLUTION INTRAVENOUS
Status: DISCONTINUED | OUTPATIENT
Start: 2021-11-12 | End: 2021-11-12 | Stop reason: HOSPADM

## 2021-11-12 RX ORDER — SODIUM CHLORIDE 0.9 % (FLUSH) 0.9 %
5-40 SYRINGE (ML) INJECTION EVERY 8 HOURS
Status: DISCONTINUED | OUTPATIENT
Start: 2021-11-12 | End: 2021-11-12 | Stop reason: HOSPADM

## 2021-11-12 RX ORDER — NITROGLYCERIN 0.4 MG/1
0.4 TABLET SUBLINGUAL
Status: DISCONTINUED | OUTPATIENT
Start: 2021-11-12 | End: 2021-11-19 | Stop reason: HOSPADM

## 2021-11-12 RX ORDER — ASPIRIN 325 MG
325 TABLET ORAL ONCE
Status: COMPLETED | OUTPATIENT
Start: 2021-11-12 | End: 2021-11-12

## 2021-11-12 RX ADMIN — MORPHINE SULFATE 2 MG: 2 INJECTION, SOLUTION INTRAMUSCULAR; INTRAVENOUS at 15:26

## 2021-11-12 RX ADMIN — ENOXAPARIN SODIUM 100 MG: 100 INJECTION SUBCUTANEOUS at 21:51

## 2021-11-12 RX ADMIN — SODIUM CHLORIDE 1000 ML: 9 INJECTION, SOLUTION INTRAVENOUS at 15:45

## 2021-11-12 RX ADMIN — NITROGLYCERIN 10 MCG/MIN: 20 INJECTION INTRAVENOUS at 15:16

## 2021-11-12 RX ADMIN — Medication 10 ML: at 15:49

## 2021-11-12 RX ADMIN — HEPARIN SODIUM AND DEXTROSE 10 UNITS/KG/HR: 10000; 5 INJECTION INTRAVENOUS at 15:28

## 2021-11-12 RX ADMIN — HEPARIN SODIUM 4000 UNITS: 1000 INJECTION INTRAVENOUS; SUBCUTANEOUS at 15:26

## 2021-11-12 RX ADMIN — ASPIRIN 325 MG: 325 TABLET ORAL at 15:25

## 2021-11-12 RX ADMIN — ATORVASTATIN CALCIUM 40 MG: 40 TABLET, FILM COATED ORAL at 21:51

## 2021-11-12 RX ADMIN — Medication 10 ML: at 21:51

## 2021-11-12 NOTE — ED PROVIDER NOTES
EMERGENCY DEPARTMENT HISTORY AND PHYSICAL EXAM      Date: 11/12/2021  Patient Name: Daquan Michael    History of Presenting Illness     Chief Complaint   Patient presents with    Chest Pain       History Provided By: Patient    HPI: Daquan Michael, 45 y.o. male with PMHx of STEMI s/p stents presents to the ED with cc of sudden CP that occurred 30min ago and gradually worsened since. Pt is very diaphoretic. Denies radiation of pain. States he stopped taking is medication several months ago because they were making him sick. Recommended [facility] for a medication amend because of this, no relief. Denies etoh or drug use. Denies back pain, fever, neck pain, or SOB. There are no other complaints, changes, or physical findings at this time. PCP: Other, MD Georgina    No current facility-administered medications on file prior to encounter. Current Outpatient Medications on File Prior to Encounter   Medication Sig Dispense Refill    clindamycin (CLEOCIN) 300 mg capsule Take 1 Capsule by mouth two (2) times a day. 14 Capsule 0    diclofenac EC (VOLTAREN) 75 mg EC tablet Take 1 Tab by mouth two (2) times a day. 14 Tab 0    metoprolol tartrate (LOPRESSOR) 25 mg tablet TAKE 1/2 TABLET BY MOUTH TWO TIMES A DAY  3    nitroglycerin (NITROSTAT) 0.4 mg SL tablet DISSOLVE 1 TABLET UNDER THE TONGUE AS NEEDED FOR CHEST PAIN, MAY REPEAT EVERY 5 MINUTES FOR 3 TOTAL DOSES. 3    carvedilol (COREG) 3.125 mg tablet TAKE ONE TABLET BY MOUTH EVERY 12 HOURS  11       Past History     Past Medical History:  Past Medical History:   Diagnosis Date    Ill-defined condition     heart attack    Other ill-defined conditions(799.89)     hx of back pain       Past Surgical History:  Past Surgical History:   Procedure Laterality Date    HX OTHER SURGICAL      3rd finger of left hand       Family History:  No family history on file.     Social History:  Social History     Tobacco Use    Smoking status: Current Every Day Smoker    Smokeless tobacco: Never Used   Substance Use Topics    Alcohol use: No    Drug use: Yes     Frequency: 7.0 times per week     Types: Marijuana, Heroin     Comment: used 5 hours ago       Allergies: Allergies   Allergen Reactions    Penicillins Anaphylaxis         Review of Systems   Review of Systems   Constitutional: Positive for diaphoresis. Negative for chills, fatigue and fever. HENT: Negative. Negative for sore throat. Eyes: Negative. Respiratory: Negative for cough and shortness of breath. Cardiovascular: Positive for chest pain. Negative for palpitations. Gastrointestinal: Negative for abdominal pain, nausea and vomiting. Genitourinary: Negative for dysuria. Musculoskeletal: Negative. Skin: Negative. Neurological: Negative for dizziness, weakness, light-headedness and headaches. Psychiatric/Behavioral: Negative. All other systems reviewed and are negative. Physical Exam   Physical Exam  Vitals and nursing note reviewed. Exam conducted with a chaperone present. Constitutional:       Appearance: Normal appearance. He is diaphoretic. He is not toxic-appearing. HENT:      Head: Normocephalic and atraumatic. Mouth/Throat:      Mouth: Mucous membranes are moist.   Eyes:      Extraocular Movements: Extraocular movements intact. Pupils: Pupils are equal, round, and reactive to light. Cardiovascular:      Rate and Rhythm: Normal rate and regular rhythm. Pulmonary:      Effort: Pulmonary effort is normal. No respiratory distress. Breath sounds: Normal breath sounds. No wheezing, rhonchi or rales. Abdominal:      General: Abdomen is flat. There is no distension. Palpations: Abdomen is soft. Tenderness: There is no abdominal tenderness. Hernia: No hernia is present. Musculoskeletal:         General: No swelling or tenderness. Normal range of motion.    Skin:     General: Skin is warm and moist.   Neurological:      General: No focal deficit present. Mental Status: He is alert and oriented to person, place, and time. Cranial Nerves: No cranial nerve deficit. Sensory: No sensory deficit. Psychiatric:         Mood and Affect: Mood normal.         Behavior: Behavior normal.         Diagnostic Study Results     Labs -   No results found for this or any previous visit (from the past 12 hour(s)). Radiologic Studies -   No orders to display     CT Results  (Last 48 hours)    None        CXR Results  (Last 48 hours)    None          Medical Decision Making   I am the first provider for this patient. I reviewed the vital signs, available nursing notes, past medical history, past surgical history, family history and social history. Vital Signs-Reviewed the patient's vital signs. Patient Vitals for the past 12 hrs:   Temp Pulse Resp BP SpO2   11/12/21 1502 98 °F (36.7 °C) (!) 53 22 (!) 159/114 99 %       EKG interpretation: (Preliminary)  Rhythm: sinus bradycardia; and irregular. Rate (approx.): 53; Axis: normal; SD interval: normal; QRS interval: normal ; ST/T wave: non-specific changes; Other findings: abnormal ekg. EKG read and interpreted by Joe Fu MD     EKG interpretation: (Preliminary)  Rhythm: sinus bradycardia; and irregular. Rate (approx.): 56; Axis: normal; SD interval: normal; QRS interval: normal ; ST/T wave: non-specific changes; Other findings: abnormal ekg. EKG read and interpreted by Joe Fu MD       Records Reviewed: Nursing Notes, Old Medical Records, Previous electrocardiograms, Previous Radiology Studies and Previous Laboratory Studies    Provider Notes (Medical Decision Making):   AMI, NSTEMI, ACS, angina     ED Course:   Initial assessment performed. The patients presenting problems have been discussed, and they are in agreement with the care plan formulated and outlined with them. I have encouraged them to ask questions as they arise throughout their visit.     Consult Note:  4:00 PM  Gavi Mendez Sae Xie MD spoke with Roger Vivar NP  Specialty: Cardiology  Discussed pts hx, disposition, and available diagnostic and imaging results. Reviewed care plans. Consultant agrees with plans as outlined. Transfer to [facility] and admitted to Dr. Toy Gibson Time:   CRITICAL CARE NOTE :    4:39 PM    IMPENDING DETERIORATION -Respiratory, Cardiovascular and Metabolic  ASSOCIATED RISK FACTORS - Hypotension, Shock, Dysrhythmia and Metabolic changes  MANAGEMENT- Bedside Assessment, Supervision of Care and Transfer  INTERPRETATION -  Xrays, ECG, Blood Pressure and Cardiac Output Measures   INTERVENTIONS - hemodynamic mngmt and Metobolic interventions  CASE REVIEW - Hospitalist/Intensivist, Medical Sub-Specialist, Nursing and Family  TREATMENT RESPONSE -Stable  PERFORMED BY - Self    NOTES   :  I have spent 75 minutes of critical care time involved in lab review, consultations with specialist, family decision- making, bedside attention and documentation. This time excludes time spent in any separate billed procedures. During this entire length of time I was immediately available to the patient . Jossie Bustamante        Disposition:   TRANSFER  4:02 PM  Patient is being transferred to [facility]. The results of their tests and reasons for their transfer have been discussed with them and/or available family. They convey agreement and understanding for the need to be transferred. Consultation has been made with the inpatient physician specialist for transfer. DISCHARGE PLAN:  1. Current Discharge Medication List        2. Follow-up Information    None       3. Return to ED if worse     Diagnosis     Clinical Impression: No diagnosis found. Attestations: This note is prepared by Magdaleno Gonzalez, acting as Scribe for Ne Mclaughlin MD.     Ne Mclaughlin MD. The scribe's documentation has been prepared under my direction and personally reviewed by me in its entirety.  I confirm that the note above accurately reflects all work, treatment, procedures and medical decision making performed by me.

## 2021-11-12 NOTE — ED NOTES
TRANSFER - OUT REPORT:    Tereso Sever report given to [facility] RN (name) on Nani Benton  being transferred to ED [facility] (unit) for routine progression of care       Report consisted of patients Situation, Background, Assessment and   Recommendations(SBAR). Information from the following report(s) SBAR, ED Summary, Intake/Output, MAR and Recent Results was reviewed with the receiving nurse. Lines:   Peripheral IV 11/12/21 Left Antecubital (Active)       Peripheral IV 11/12/21 Anterior; Proximal; Right Forearm (Active)        Opportunity for questions and clarification was provided.       Patient transported with:   EMS

## 2021-11-12 NOTE — Clinical Note
TRANSFER - OUT REPORT:     Verbal report given to: katerin puente. Report consisted of patient's Situation, Background, Assessment and   Recommendations(SBAR). Opportunity for questions and clarification was provided. Patient transported with a Registered Nurse.

## 2021-11-12 NOTE — Clinical Note
Procedure aborted as pt agitated and is not able to remain still on table.  Will stage with anesthesia

## 2021-11-12 NOTE — Clinical Note
Right brachial artery. Accessed successfully. Radial access needle used. Using ultrasound guidance. Number of attempts =  2.

## 2021-11-12 NOTE — Clinical Note
TRANSFER - OUT REPORT:     Verbal report given to: [facility] nurse. Report consisted of patient's Situation, Background, Assessment and   Recommendations(SBAR). Opportunity for questions and clarification was provided. Patient transported with a Registered Nurse. Patient transported to: [facility].

## 2021-11-12 NOTE — ED TRIAGE NOTES
Patient comes to the ED for evaluation and treatment of Chest Pain since 9am.  Patient reported history of CAD with MI and stent x1. Patient said he has not taken \"meds for months\". Patient pale and diaphoretic.

## 2021-11-12 NOTE — ED NOTES
Patient here with c/o chest pain. Patient states pain began around 09:00am today, states worsening this afternoon ~30 min PTA, when coming home from seeing his grandchild. Patient states that he had a heart attack 2 years ago, states stents were placed. Patient states that he was on medications but stopped taking them because they did not make him feel well. Patient denies changes to diet, denied N/V/D. Patient denies drug use. Patient pale and very diaphoretic. Patient otherwise alert and oriented, with intermittent shutting eyes but arouses very easily. Patient denies fevers. Patient reports he is a smoker. Emergency Department Nursing Plan of Care       The Nursing Plan of Care is developed from the Nursing assessment and Emergency Department Attending provider initial evaluation. The plan of care may be reviewed in the ED Provider note.     The Plan of Care was developed with the following considerations:   Patient / Family readiness to learn indicated by:verbalized understanding  Persons(s) to be included in education: patient  Barriers to Learning/Limitations:No    Signed     Reina Lane RN    11/12/2021   3:10 PM

## 2021-11-12 NOTE — PROGRESS NOTES
[facility]. Patient is a 54-year-old male discussed with ER attending presenting with chest pain diaphoresis hypertension. Has history of non-STEMI 9/2019 PTCA stenting of OM1. [facility] records reviewed, EF 65% 3/2020. Has been noncompliant with medications, first troponin negative, per attending no acute EKG changes unable to see EKG at this time. Recommend transfer to [facility] given history of CAD, presenting symptoms. Continue nitro/heparin drip. Previous bradycardia while on carvedilol per [facility] records. Dr. Daune Bloch cardiology at [facility] [address] will round on patient, most likely will need cardiac cath Monday, sooner if unstable. Discussed with Dr. Burnice Fleischer attending  [facility] for transfer.      Salma Hoffmann, DNP, ANP-BC  [facility]

## 2021-11-12 NOTE — PROGRESS NOTES
[phone]: TRANSFER - IN REPORT:    Verbal report received from DAYAMI Ramirez (name) on Tabitha Lights  being received from [facility] ED (unit) for routine progression of care      Report consisted of patients Situation, Background, Assessment and   Recommendations(SBAR). Information from the following report(s) SBAR, Kardex, ED Summary, Intake/Output, MAR, Recent Results and Cardiac Rhythm Sinus [place] was reviewed with the receiving nurse. Opportunity for questions and clarification was provided. Assessment completed upon patients arrival to unit and care assumed. 1900: Bedside shift change report given to DAYAMI Hart (oncoming nurse) by [place], RN (offgoing nurse). Report included the following information SBAR, Kardex, ED Summary, Intake/Output, MAR, Recent Results and Cardiac Rhythm Sinus [place].

## 2021-11-13 PROBLEM — Z91.14 NONCOMPLIANCE WITH MEDICATION REGIMEN: Status: ACTIVE | Noted: 2021-11-13

## 2021-11-13 PROBLEM — I25.10 CAD (CORONARY ARTERY DISEASE), NATIVE CORONARY ARTERY: Status: ACTIVE | Noted: 2021-11-13

## 2021-11-13 PROBLEM — F19.10 POLYSUBSTANCE ABUSE (HCC): Status: ACTIVE | Noted: 2021-11-13

## 2021-11-13 PROBLEM — Z98.61 S/P PTCA (PERCUTANEOUS TRANSLUMINAL CORONARY ANGIOPLASTY): Status: ACTIVE | Noted: 2021-11-13

## 2021-11-13 PROBLEM — E78.2 MIXED HYPERLIPIDEMIA: Status: ACTIVE | Noted: 2021-11-13

## 2021-11-13 LAB
ANION GAP SERPL CALC-SCNC: 4 MMOL/L (ref 5–15)
BASOPHILS # BLD: 0 K/UL (ref 0–0.1)
BASOPHILS NFR BLD: 1 % (ref 0–1)
BUN SERPL-MCNC: 10 MG/DL (ref 6–20)
BUN/CREAT SERPL: 14 (ref 12–20)
CALCIUM SERPL-MCNC: 9.5 MG/DL (ref 8.5–10.1)
CHLORIDE SERPL-SCNC: 109 MMOL/L (ref 97–108)
CHOLEST SERPL-MCNC: 181 MG/DL
CO2 SERPL-SCNC: 26 MMOL/L (ref 21–32)
CREAT SERPL-MCNC: 0.72 MG/DL (ref 0.7–1.3)
DIFFERENTIAL METHOD BLD: ABNORMAL
EOSINOPHIL # BLD: 0.5 K/UL (ref 0–0.4)
EOSINOPHIL NFR BLD: 9 % (ref 0–7)
ERYTHROCYTE [DISTWIDTH] IN BLOOD BY AUTOMATED COUNT: 14.8 % (ref 11.5–14.5)
GLUCOSE SERPL-MCNC: 94 MG/DL (ref 65–100)
HCT VFR BLD AUTO: 37.7 % (ref 36.6–50.3)
HDLC SERPL-MCNC: 48 MG/DL
HDLC SERPL: 3.8 {RATIO} (ref 0–5)
HGB BLD-MCNC: 12.1 G/DL (ref 12.1–17)
IMM GRANULOCYTES # BLD AUTO: 0 K/UL (ref 0–0.04)
IMM GRANULOCYTES NFR BLD AUTO: 0 % (ref 0–0.5)
LDLC SERPL CALC-MCNC: 113.6 MG/DL (ref 0–100)
LYMPHOCYTES # BLD: 2.8 K/UL (ref 0.8–3.5)
LYMPHOCYTES NFR BLD: 46 % (ref 12–49)
MAGNESIUM SERPL-MCNC: 2.1 MG/DL (ref 1.6–2.4)
MCH RBC QN AUTO: 24.2 PG (ref 26–34)
MCHC RBC AUTO-ENTMCNC: 32.1 G/DL (ref 30–36.5)
MCV RBC AUTO: 75.2 FL (ref 80–99)
MONOCYTES # BLD: 0.5 K/UL (ref 0–1)
MONOCYTES NFR BLD: 9 % (ref 5–13)
NEUTS SEG # BLD: 2 K/UL (ref 1.8–8)
NEUTS SEG NFR BLD: 35 % (ref 32–75)
NRBC # BLD: 0 K/UL (ref 0–0.01)
NRBC BLD-RTO: 0 PER 100 WBC
PHOSPHATE SERPL-MCNC: 2.8 MG/DL (ref 2.6–4.7)
PLATELET # BLD AUTO: 217 K/UL (ref 150–400)
PMV BLD AUTO: 11.2 FL (ref 8.9–12.9)
POTASSIUM SERPL-SCNC: 3.9 MMOL/L (ref 3.5–5.1)
RBC # BLD AUTO: 5.01 M/UL (ref 4.1–5.7)
SODIUM SERPL-SCNC: 139 MMOL/L (ref 136–145)
TRIGL SERPL-MCNC: 97 MG/DL (ref ?–150)
TROPONIN-HIGH SENSITIVITY: 336 NG/L (ref 0–76)
VLDLC SERPL CALC-MCNC: 19.4 MG/DL
WBC # BLD AUTO: 5.9 K/UL (ref 4.1–11.1)

## 2021-11-13 PROCEDURE — 93005 ELECTROCARDIOGRAM TRACING: CPT

## 2021-11-13 PROCEDURE — 74011250636 HC RX REV CODE- 250/636: Performed by: INTERNAL MEDICINE

## 2021-11-13 PROCEDURE — 36415 COLL VENOUS BLD VENIPUNCTURE: CPT

## 2021-11-13 PROCEDURE — 84100 ASSAY OF PHOSPHORUS: CPT

## 2021-11-13 PROCEDURE — 65660000000 HC RM CCU STEPDOWN

## 2021-11-13 PROCEDURE — 84484 ASSAY OF TROPONIN QUANT: CPT

## 2021-11-13 PROCEDURE — 74011250637 HC RX REV CODE- 250/637: Performed by: INTERNAL MEDICINE

## 2021-11-13 PROCEDURE — 80061 LIPID PANEL: CPT

## 2021-11-13 PROCEDURE — 85025 COMPLETE CBC W/AUTO DIFF WBC: CPT

## 2021-11-13 PROCEDURE — 99223 1ST HOSP IP/OBS HIGH 75: CPT | Performed by: INTERNAL MEDICINE

## 2021-11-13 PROCEDURE — 80048 BASIC METABOLIC PNL TOTAL CA: CPT

## 2021-11-13 PROCEDURE — 83735 ASSAY OF MAGNESIUM: CPT

## 2021-11-13 RX ADMIN — ENOXAPARIN SODIUM 100 MG: 100 INJECTION SUBCUTANEOUS at 21:25

## 2021-11-13 RX ADMIN — Medication 10 ML: at 14:00

## 2021-11-13 RX ADMIN — ENOXAPARIN SODIUM 100 MG: 100 INJECTION SUBCUTANEOUS at 08:35

## 2021-11-13 RX ADMIN — Medication 10 ML: at 21:27

## 2021-11-13 RX ADMIN — Medication 10 ML: at 06:34

## 2021-11-13 RX ADMIN — ATORVASTATIN CALCIUM 40 MG: 40 TABLET, FILM COATED ORAL at 21:25

## 2021-11-13 RX ADMIN — CLOPIDOGREL BISULFATE 75 MG: 75 TABLET ORAL at 08:35

## 2021-11-13 RX ADMIN — ASPIRIN 81 MG: 81 TABLET, CHEWABLE ORAL at 08:35

## 2021-11-13 RX ADMIN — CARVEDILOL 3.12 MG: 3.12 TABLET, FILM COATED ORAL at 08:35

## 2021-11-13 NOTE — CONSULTS
[address] NOTE       Date of  Admission: 11/12/2021  7:23 PM     Admission type:Urgent    Referral for:  [place]  Referred by: Pola Nash NP and hospitalists     Subjective:     Clair Zendejas is a 45 y.o. male admitted for ACS (acute coronary syndrome) ([facility] [I24.9]. 45 y.o. male with a past medical history significant for CAD who presented with a 1 day history of chest pain. Patient states he was moving boxes with his grandkids this morning. He then developed substernal chest pain that he described as a pressure. He then had associated shortness of breath and some sweating. He came to the ED for further evaluation. He does have a history of CAD status post stent placement. However, he stopped taking his medications a few months ago because he said one of them did not make him feel right. He also recently used cocaine and heroin in the past 3 days. At the time my examination, he has no complaints. In the ED, he was given heparin drip and a nitro drip which resolved his symptoms. He has subsequently been placed on treatment dose Lovenox. I discussed with the patient the possibility of cardiac catheterization and PCI and explained that it is critically important to be compliant with DAPT for at least a year after stenting. I told him I am worried about his cocaine and heroin use and he admits he uses both of them frequently for years. He admits that he has a history of forgetting medications and being noncompliant on a recurrent basis. I told him if we are to consider catheterization with PCI, he must assure me that he will be 100% compliant with DAPT. He is going to think about it.     Patient Active Problem List    Diagnosis Date Noted    Polysubstance abuse ([facility] 11/13/2021    Noncompliance with medication regimen 11/13/2021    CAD (coronary artery disease), native coronary artery 11/13/2021    S/P PTCA (percutaneous transluminal coronary angioplasty) 11/13/2021    Mixed hyperlipidemia 11/13/2021    ACS (acute coronary syndrome) ([facility] 11/12/2021      Other, MD Georgina  Past Medical History:   Diagnosis Date    Ill-defined condition     heart attack    Other ill-defined conditions(799.89)     hx of back pain       Social History     Socioeconomic History    Marital status:    Tobacco Use    Smoking status: Current Every Day Smoker    Smokeless tobacco: Never Used   Substance and Sexual Activity    Alcohol use: No    Drug use: Yes     Frequency: 7.0 times per week     Types: Marijuana, Heroin     Comment: used 5 hours ago    Sexual activity: Yes     Partners: Female     Allergies   Allergen Reactions    Penicillins Anaphylaxis      No family history on file. Current Facility-Administered Medications   Medication Dose Route Frequency    sodium chloride (NS) flush 5-40 mL  5-40 mL IntraVENous Q8H    sodium chloride (NS) flush 5-40 mL  5-40 mL IntraVENous PRN    nitroglycerin (NITROSTAT) tablet 0.4 mg  0.4 mg SubLINGual Q5MIN PRN    atorvastatin (LIPITOR) tablet 40 mg  40 mg Oral QHS    aspirin chewable tablet 81 mg  81 mg Oral DAILY    clopidogreL (PLAVIX) tablet 75 mg  75 mg Oral DAILY    [Held by provider] carvediloL (COREG) tablet 3.125 mg  3.125 mg Oral BID WITH MEALS    enoxaparin (LOVENOX) injection 100 mg  1 mg/kg SubCUTAneous Q12H         Review of Symptoms:  11 systems reviewed, negative other than as stated in HPI     Subjective:        Visit Vitals  /80 (BP 1 Location: Right upper arm)   Pulse 64   Temp 98 °F (36.7 °C)   Resp 20   SpO2 98%       Physical:  Gen:  NAD  Heart: regular rhythm, no murmur, gallop or rub  Lungs: clear to auscultation bilaterally  Neck: supple, symmetrical, trachea midline, no adenopathy, thyroid: not enlarged, symmetric, no tenderness/mass/nodules, no carotid bruit and no JVD  Abdomen: soft, non-tender.  Bowel sounds normal. No masses,  no organomegaly  Extremities: extremities normal, atraumatic, no cyanosis or edema, positive femorals without bruits, normal dp pulses  Neurologic: CN, motor and speech grossly normal    Data Review:   Recent Labs     11/13/21  0007 11/12/21  1525 11/12/21  1512   WBC 5.9 5.1 6.5   HGB 12.1 12.3 13.1   HCT 37.7 40.0 42.6    184 234     Recent Labs     11/13/21  0007 11/12/21  1512    141   K 3.9 3.4*   * 103   CO2 26 29   GLU 94 77   BUN 10 10   CREA 0.72 0.85   CA 9.5 10.0   MG 2.1  --    PHOS 2.8  --    INR  --  1.0       No results for input(s): TROIQ, CPK, CKMB in the last 72 hours. Lab Results   Component Value Date/Time    Cholesterol, total 181 11/13/2021 12:07 AM    HDL Cholesterol 48 11/13/2021 12:07 AM    LDL, calculated 113.6 (H) 11/13/2021 12:07 AM    VLDL, calculated 19.4 11/13/2021 12:07 AM    Triglyceride 97 11/13/2021 12:07 AM    CHOL/HDL Ratio 3.8 11/13/2021 12:07 AM           Intake/Output Summary (Last 24 hours) at 11/13/2021 1623  Last data filed at 11/13/2021 1457  Gross per 24 hour   Intake 480 ml   Output --   Net 480 ml        Cardiographics    Telemetry: normal sinus rhythm    ECG:  Sinus bradycardia   Nonspecific ST and T wave abnormality    Echocardiogram:   ordered     Assessment:       Principal Problem:    ACS (acute coronary syndrome) ([facility] (11/12/2021)    Active Problems:    Polysubstance abuse ([facility] (11/13/2021)      Noncompliance with medication regimen (11/13/2021)      CAD (coronary artery disease), native coronary artery (11/13/2021)      S/P PTCA (percutaneous transluminal coronary angioplasty) (11/13/2021)      Mixed hyperlipidemia (11/13/2021)         Plan:     45 y.o. with multiple CV risk factors, admitted with unstable angina pectoris/mild troponin elevation in the setting of recent cocaine use:  · Telemetry   · ASA, plavix (continue for now, started by hospitalist), statin, lovenox for now.     · Hold beta-blocker at this time due to pulse in the 50s at times and recent cocaine abuse  · I have discussed the risks and benefits of cardiac catheterization +/- percutaneous coronary intervention if necessary. · I discussed with the patient the possibility of cardiac catheterization and PCI and explained that it is critically important to be compliant with DAPT for at least a year after stenting. I told him I am worried about his cocaine and heroin use and he admits he uses both of them frequently for years. He admits that he has a history of forgetting medications and being noncompliant on a recurrent basis. I told him if we are to consider catheterization with PCI, he must assure me that he will be 100% compliant with DAPT. He is going to think about it. · If he cannot assure me 1#compliance, and remains pain-free on a good medication regimen, in this case, we may need to start with a stress Myoview rather than immediate invasive evaluation. · The patient knows to call a nurse immediately if any recurrence/ progression of symptoms.

## 2021-11-13 NOTE — PROGRESS NOTES
Hospitalist Progress Note    NAME: Maddi Hoang   :  1970   MRN:  [id number]       Assessment / Plan:  NSTEMI    C/w therapeutic lovenox  C/w Aspirin, high intensity statin, Plavix  Hold Coreg d/t Cocaine use  Cardiology consulted  Nitro as needed for chest pain  HS Tropnin: Uptrending  Remains NPO until Cardio eval  LDL: 113  No chest pain this AM     Substance abuse  Positive for cocaine which he states he used 3 days ago  Also admits to heroin which he used 2 days ago  Counseled on cessation     Code Status: Full  Surrogate Decision Maker: Wife     DVT Prophylaxis: Lovenox  GI Prophylaxis: not indicated     Baseline: Independent                Estimated discharge date:   Barriers:     Subjective:     Chief Complaint / Reason for Physician Visit  He doesn't have any chest pain this AM    Review of Systems:  Symptom Y/N Comments  Symptom Y/N Comments   Fever/Chills n   Chest Pain n    Poor Appetite n   Edema n    Cough    Abdominal Pain     Sputum    Joint Pain     SOB/LAMA    Pruritis/Rash     Nausea/vomit    Tolerating PT/OT     Diarrhea    Tolerating Diet     Constipation    Other       Could NOT obtain due to:      Objective:     VITALS:   Last 24hrs VS reviewed since prior progress note. Most recent are:  Patient Vitals for the past 24 hrs:   Temp Pulse Resp BP SpO2   21 0835 -- 70 -- (!) 130/97 --   21 0719 98.7 °F (37.1 °C) 75 18 (!) 143/89 97 %   21 0304 97.9 °F (36.6 °C) (!) 57 18 110/72 98 %   21 2254 97.7 °F (36.5 °C) (!) 59 17 114/81 97 %   21 1922 97.8 °F (36.6 °C) (!) 57 18 (!) 139/99 99 %     No intake or output data in the 24 hours ending 21 1127     I had a face to face encounter and independently examined this patient on 2021, as outlined below:  PHYSICAL EXAM:  General: WD, WN. Alert, cooperative, no acute distress    EENT:  EOMI. Anicteric sclerae. MMM  Resp:  CTA bilaterally, no wheezing or rales.   No accessory muscle use  CV:  Regular  rhythm,  No edema  GI:  Soft, Non distended, Non tender. +Bowel sounds  Neurologic:  Alert and oriented X 3, normal speech,   Psych:   Good insight. Not anxious nor agitated  Skin:  No rashes. No jaundice    Reviewed most current lab test results and cultures  YES  Reviewed most current radiology test results   YES  Review and summation of old records today    NO  Reviewed patient's current orders and MAR    YES  PMH/SH reviewed - no change compared to H&P  ________________________________________________________________________  Care Plan discussed with:    Comments   Patient y    Family      RN y    Care Manager     Consultant                        Multidiciplinary team rounds were held today with , nursing, pharmacist and clinical coordinator. Patient's plan of care was discussed; medications were reviewed and discharge planning was addressed. ________________________________________________________________________  Total NON critical care TIME:  36  Minutes    Total CRITICAL CARE TIME Spent:   Minutes non procedure based      Comments   >50% of visit spent in counseling and coordination of care     ________________________________________________________________________  Kimi Bansal MD     Procedures: see electronic medical records for all procedures/Xrays and details which were not copied into this note but were reviewed prior to creation of Plan. LABS:  I reviewed today's most current labs and imaging studies.   Pertinent labs include:  Recent Labs     11/13/21  0007 11/12/21  1525 11/12/21  1512   WBC 5.9 5.1 6.5   HGB 12.1 12.3 13.1   HCT 37.7 40.0 42.6    184 234     Recent Labs     11/13/21  0007 11/12/21  1512    141   K 3.9 3.4*   * 103   CO2 26 29   GLU 94 77   BUN 10 10   CREA 0.72 0.85   CA 9.5 10.0   MG 2.1  --    PHOS 2.8  --    INR  --  1.0       Signed: Kimi Bansal MD

## 2021-11-13 NOTE — H&P
Hospitalist Admission Note    NAME: Estephania Overton   :  1970   MRN:  [id number]     Date/Time:  2021 8:01 PM    Patient PCP: Georgina Altamirano MD  _____________________________________________________________________  Given the patient's current clinical presentation, I have a high level of concern for decompensation if discharged from the emergency department. Complex decision making was performed, which includes reviewing the patient's available past medical records, laboratory results, and x-ray films. My assessment of this patient's clinical condition and my plan of care is as follows. Assessment / Plan:  ACS  CAD  Therapeutic Lovenox  Aspirin, high intensity statin, Plavix  Resume Coreg  Cardiology consult  Nitro as needed for chest pain  Repeat EKG  Trend troponins  N.p.o. past midnight  Check lipid panel    Substance abuse  Positive for cocaine which he states he used 3 days ago  Also admits to heroin which he used 2 days ago  Counseled on cessation    Code Status: Full  Surrogate Decision Maker: Wife    DVT Prophylaxis: Lovenox  GI Prophylaxis: not indicated    Baseline: Independent        Subjective:   CHIEF COMPLAINT: Chest pain    HISTORY OF PRESENT ILLNESS:     Estephania Overton is a 45 y.o. male with a past medical history significant for CAD who presented with a 1 day history of chest pain. Patient states he was moving boxes with his grandkids this morning. He then developed substernal chest pain that he described as a pressure. He then had associated shortness of breath and some sweating. He came to the ED for further evaluation. He does have a history of CAD status post stent placement. However, he stopped taking his medications a few months ago because he said one of them did not make him feel right. He also recently used cocaine and heroin in the past 3 days. At the time my examination, he currently has no complaints.   In the ED, he was given heparin drip and a nitro drip which resolved his symptoms. He denies any fevers or chills. No nausea vomiting or diarrhea. We were asked to admit for work up and evaluation of the above problems. Past Medical History:   Diagnosis Date    Ill-defined condition     heart attack    Other ill-defined conditions(799.89)     hx of back pain        Past Surgical History:   Procedure Laterality Date    HX OTHER SURGICAL      3rd finger of left hand       Social History     Tobacco Use    Smoking status: Current Every Day Smoker    Smokeless tobacco: Never Used   Substance Use Topics    Alcohol use: No        No family history on file. Allergies   Allergen Reactions    Penicillins Anaphylaxis        Prior to Admission medications    Medication Sig Start Date End Date Taking? Authorizing Provider   clindamycin (CLEOCIN) 300 mg capsule Take 1 Capsule by mouth two (2) times a day. 10/15/21   Jorge Hoover NP   diclofenac EC (VOLTAREN) 75 mg EC tablet Take 1 Tab by mouth two (2) times a day. 3/27/21   Jorge Hoover NP   metoprolol tartrate (LOPRESSOR) 25 mg tablet TAKE 1/2 TABLET BY MOUTH TWO TIMES A DAY 10/9/19   Georgina Altamirano MD   nitroglycerin (NITROSTAT) 0.4 mg SL tablet DISSOLVE 1 TABLET UNDER THE TONGUE AS NEEDED FOR CHEST PAIN, MAY REPEAT EVERY 5 MINUTES FOR 3 TOTAL DOSES. 10/9/19   Georgina Altamirano MD   carvedilol (COREG) 3.125 mg tablet TAKE ONE TABLET BY MOUTH EVERY 12 HOURS 10/2/19   Georgina Altamirano MD       REVIEW OF SYSTEMS:     I am not able to complete the review of systems because:    The patient is intubated and sedated    The patient has altered mental status due to his acute medical problems    The patient has baseline aphasia from prior stroke(s)    The patient has baseline dementia and is not reliable historian    The patient is in acute medical distress and unable to provide information           Total of 12 systems reviewed as follows:       POSITIVE= underlined text  Negative = text not underlined  General:  fever, chills, sweats, generalized weakness, weight loss/gain,      loss of appetite   Eyes:    blurred vision, eye pain, loss of vision, double vision  ENT:    rhinorrhea, pharyngitis   Respiratory:   cough, sputum production, SOB, LAMA, wheezing, pleuritic pain   Gastrointestinal:  abdominal pain , N/V, diarrhea, dysphagia, constipation, bleeding   Genitourinary:  frequency, urgency, dysuria, hematuria, incontinence   Muskuloskeletal :  arthralgia, myalgia, back pain  Hematology:  easy bruising, nose or gum bleeding, lymphadenopathy   Dermatological: rash, ulceration, pruritis, color change / jaundice  Endocrine:   hot flashes or polydipsia   Neurological:  headache, dizziness, confusion, focal weakness, paresthesia,     Speech difficulties, memory loss, gait difficulty  Psychological: Feelings of anxiety, depression, agitation    Objective:   VITALS:    Visit Vitals  BP (!) 139/99   Pulse (!) 57   Temp 97.8 °F (36.6 °C)   Resp 18   SpO2 99%       PHYSICAL EXAM:    General:    Alert, cooperative, no distress, appears stated age. HEENT: Atraumatic, anicteric sclerae, pink conjunctivae     No oral ulcers, mucosa moist, throat clear, dentition fair  Neck:  Supple, symmetrical,  thyroid: non tender  Lungs:   Clear to auscultation bilaterally. No Wheezing or Rhonchi. No rales. Chest wall:  No tenderness  No Accessory muscle use. Heart:   Regular  rhythm,  No  murmur   No edema  Abdomen:   Soft, non-tender. Not distended. Bowel sounds normal  Extremities: No cyanosis. No clubbing,      Skin turgor normal, Capillary refill normal, Radial dial pulse 2+  Skin:     Not pale. Not Jaundiced  No rashes   Psych:  Good insight. Not depressed. Not anxious or agitated. Neurologic: EOMs intact. No facial asymmetry. No aphasia or slurred speech. Symmetrical strength, Sensation grossly intact.  Alert and oriented X 4.     _______________________________________________________________________  Care Plan discussed with:    Comments   Patient x    Family      RN     Care Manager                    Consultant:      _______________________________________________________________________  Expected  Disposition:   Home with Family    HH/PT/OT/RN    SNF/LTC    IKER    ________________________________________________________________________  TOTAL TIME:  32  Minutes    Critical Care Provided     Minutes non procedure based      Comments     Reviewed previous records   >50% of visit spent in counseling and coordination of care  Discussion with patient and/or family and questions answered       Given the patient's current clinical presentation, I have a high level of concern for decompensation if discharged from the ED. Complex decision making was performed which includes reviewing the patient's available past medical records, laboratory results, and Xray films. I have also directly communicated my plan and discussed this case with the involved ED physician.     ____________________________________________________________________  Eneida Cox MD    Procedures: see electronic medical records for all procedures/Xrays and details which were not copied into this note but were reviewed prior to creation of Plan. LAB DATA REVIEWED:    Recent Results (from the past 24 hour(s))   CBC WITH AUTOMATED DIFF    Collection Time: 11/12/21  3:12 PM   Result Value Ref Range    WBC 6.5 4.1 - 11.1 K/uL    RBC 5.52 4.10 - 5.70 M/uL    HGB 13.1 12.1 - 17.0 g/dL    HCT 42.6 36.6 - 50.3 %    MCV 77.2 (L) 80.0 - 99.0 FL    MCH 23.7 (L) 26.0 - 34.0 PG    MCHC 30.8 30.0 - 36.5 g/dL    RDW 15.0 (H) 11.5 - 14.5 %    PLATELET 371 698 - 817 K/uL    MPV 10.9 8.9 - 12.9 FL    NRBC 0.0 0  WBC    ABSOLUTE NRBC 0.00 0.00 - 0.01 K/uL    NEUTROPHILS 41 32 - 75 %    LYMPHOCYTES 41 12 - 49 %    MONOCYTES 10 5 - 13 %    EOSINOPHILS 7 0 - 7 %    BASOPHILS 1 0 - 1 %    IMMATURE GRANULOCYTES 0 0.0 - 0.5 %    ABS.  NEUTROPHILS 2.6 1.8 - 8.0 K/UL ABS. LYMPHOCYTES 2.7 0.8 - 3.5 K/UL    ABS. MONOCYTES 0.7 0.0 - 1.0 K/UL    ABS. EOSINOPHILS 0.5 (H) 0.0 - 0.4 K/UL    ABS. BASOPHILS 0.0 0.0 - 0.1 K/UL    ABS. IMM. GRANS. 0.0 0.00 - 0.04 K/UL    DF AUTOMATED     METABOLIC PANEL, BASIC    Collection Time: 11/12/21  3:12 PM   Result Value Ref Range    Sodium 141 136 - 145 mmol/L    Potassium 3.4 (L) 3.5 - 5.1 mmol/L    Chloride 103 97 - 108 mmol/L    CO2 29 21 - 32 mmol/L    Anion gap 9 5 - 15 mmol/L    Glucose 77 65 - 100 mg/dL    BUN 10 6 - 20 MG/DL    Creatinine 0.85 0.70 - 1.30 MG/DL    BUN/Creatinine ratio 12 12 - 20      GFR est AA >60 >60 ml/min/1.73m2    GFR est non-AA >60 >60 ml/min/1.73m2    Calcium 10.0 8.5 - 10.1 MG/DL   PROTHROMBIN TIME + INR    Collection Time: 11/12/21  3:12 PM   Result Value Ref Range    INR 1.0 0.9 - 1.1      Prothrombin time 9.9 9.0 - 11.1 sec   TROPONIN-HIGH SENSITIVITY    Collection Time: 11/12/21  3:12 PM   Result Value Ref Range    Troponin-High Sensitivity 14 0 - 76 ng/L   PTT    Collection Time: 11/12/21  3:25 PM   Result Value Ref Range    aPTT 21.0 (L) 22.1 - 31.0 sec    aPTT, therapeutic range     58.0 - 77.0 SECS   CBC WITH AUTOMATED DIFF    Collection Time: 11/12/21  3:25 PM   Result Value Ref Range    WBC 5.1 4.1 - 11.1 K/uL    RBC 5.05 4. 10 - 5.70 M/uL    HGB 12.3 12.1 - 17.0 g/dL    HCT 40.0 36.6 - 50.3 %    MCV 79.2 (L) 80.0 - 99.0 FL    MCH 24.4 (L) 26.0 - 34.0 PG    MCHC 30.8 30.0 - 36.5 g/dL    RDW 15.2 (H) 11.5 - 14.5 %    PLATELET 721 158 - 578 K/uL    MPV 11.0 8.9 - 12.9 FL    NRBC 0.0 0  WBC    ABSOLUTE NRBC 0.00 0.00 - 0.01 K/uL    NEUTROPHILS 43 32 - 75 %    LYMPHOCYTES 39 12 - 49 %    MONOCYTES 10 5 - 13 %    EOSINOPHILS 7 0 - 7 %    BASOPHILS 1 0 - 1 %    IMMATURE GRANULOCYTES 0 0.0 - 0.5 %    ABS. NEUTROPHILS 2.2 1.8 - 8.0 K/UL    ABS. LYMPHOCYTES 2.0 0.8 - 3.5 K/UL    ABS. MONOCYTES 0.5 0.0 - 1.0 K/UL    ABS. EOSINOPHILS 0.4 0.0 - 0.4 K/UL    ABS. BASOPHILS 0.0 0.0 - 0.1 K/UL    ABS. IMM. GRANS. 0.0 0.00 - 0.04 K/UL    DF AUTOMATED     EKG, 12 LEAD, INITIAL    Collection Time: 11/12/21  4:12 PM   Result Value Ref Range    Ventricular Rate 56 BPM    Atrial Rate 56 BPM    P-R Interval 168 ms    QRS Duration 90 ms    Q-T Interval 412 ms    QTC Calculation (Bezet) 397 ms    Calculated P Axis 56 degrees    Calculated R Axis 44 degrees    Calculated T Axis 9 degrees    Diagnosis       Sinus bradycardia  Nonspecific T wave abnormality  Abnormal ECG  When compared with ECG of 12-NOV-2021 14:59,  MANUAL COMPARISON REQUIRED, DATA IS UNCONFIRMED     DRUG SCREEN, URINE    Collection Time: 11/12/21  4:46 PM   Result Value Ref Range    AMPHETAMINES Negative NEG      BARBITURATES Negative NEG      BENZODIAZEPINES Negative NEG      COCAINE Positive (A) NEG      METHADONE Negative NEG      OPIATES Positive (A) NEG      PCP(PHENCYCLIDINE) Negative NEG      THC (TH-CANNABINOL) Negative NEG      Drug screen comment (NOTE)    URINALYSIS W/ REFLEX CULTURE    Collection Time: 11/12/21  4:46 PM    Specimen: Urine   Result Value Ref Range    Color YELLOW/STRAW      Appearance TURBID (A) CLEAR      Specific gravity 1.025 1.003 - 1.030      pH (UA) 7.5 5.0 - 8.0      Protein Negative NEG mg/dL    Glucose Negative NEG mg/dL    Ketone Negative NEG mg/dL    Bilirubin Negative NEG      Blood Negative NEG      Urobilinogen 1.0 0.2 - 1.0 EU/dL    Nitrites Negative NEG      Leukocyte Esterase Negative NEG      WBC 5-10 0 - 4 /hpf    RBC 0-5 0 - 5 /hpf    Epithelial cells FEW FEW /lpf    Bacteria Negative NEG /hpf    UA:UC IF INDICATED CULTURE NOT INDICATED BY UA RESULT CNI      Amorphous Crystals 3+ (A) NEG   COVID-19 WITH INFLUENZA A/B    Collection Time: 11/12/21  4:48 PM   Result Value Ref Range    SARS-CoV-2 Not detected NOTD      Influenza A by PCR Not detected      Influenza B by PCR Not detected     TROPONIN-HIGH SENSITIVITY    Collection Time: 11/12/21  5:22 PM   Result Value Ref Range    Troponin-High Sensitivity 38 0 - 76 ng/L

## 2021-11-13 NOTE — PROGRESS NOTES
End of Shift Note    Bedside shift change report given to Ivonne STOCK (oncoming nurse) by Jeff Avina (offgoing nurse). Report included the following information SBAR and Kardex    Shift worked:  Night     Shift summary and any significant changes:     Direct admit from [facility]. EKG done, showing sinus saman. Troponin trended X2. No c/o chest pain overnight. Patient NPO since midnight. Concerns for physician to address:       Zone phone for oncoming shift:   [phone]       Activity:  Activity Level: Up ad kike  Number times ambulated in hallways past shift: 0  Number of times OOB to chair past shift: 0    Cardiac:   Cardiac Monitoring: Yes      Cardiac Rhythm: Sinus Rhythm, Sinus Saman    Access:   Current line(s): PIV     Genitourinary:   Urinary status: voiding    Respiratory:   O2 Device: None (Room air)  Chronic home O2 use?: NO  Incentive spirometer at bedside: NO     GI:  Last Bowel Movement Date:  (PTA)  Current diet:  DIET NPO  Passing flatus: YES  Tolerating current diet: YES       Pain Management:   Patient states pain is manageable on current regimen: YES    Skin:  Shar Score: 21  Interventions: increase time out of bed    Patient Safety:  Fall Score:  Total Score: 1  Interventions: gripper socks and pt to call before getting OOB       Length of Stay:  Expected LOS: - - -  Actual LOS: [address]

## 2021-11-13 NOTE — PROGRESS NOTES
Problem: Falls - Risk of  Goal: *Absence of Falls  Description: Document Nereyda Padilla Fall Risk and appropriate interventions in the flowsheet.   Outcome: Progressing Towards Goal  Note: Fall Risk Interventions:            Medication Interventions: Bed/chair exit alarm, Patient to call before getting OOB, Teach patient to arise slowly                   Problem: Patient Education: Go to Patient Education Activity  Goal: Patient/Family Education  Outcome: Progressing Towards Goal

## 2021-11-13 NOTE — PROGRESS NOTES
0700: Bedside shift change report given to DAYAMI Coronado (oncoming nurse) by Lita Hannon RN (offgoing nurse). Report included the following information SBAR, Kardex, ED Summary and Intake/Output. 1400: Patient just finished walking several times up and down the long bras[place] of [facility] per request of Dr. Yoshi Glililand. He had no complaints of SOB or chest pain. 1625: Transfer orders put in to send to [place] [id number]. Called report to the RN assigned to bed 2162. Will be transporting shortly.

## 2021-11-13 NOTE — PROGRESS NOTES
Problem: Falls - Risk of  Goal: *Absence of Falls  Description: Document Mcgraw Brooke Fall Risk and appropriate interventions in the flowsheet.   Outcome: Progressing Towards Goal  Note: Fall Risk Interventions:            Medication Interventions: Bed/chair exit alarm, Patient to call before getting OOB, Teach patient to arise slowly                   Problem: Patient Education: Go to Patient Education Activity  Goal: Patient/Family Education  Outcome: Progressing Towards Goal

## 2021-11-14 ENCOUNTER — APPOINTMENT (OUTPATIENT)
Dept: NON INVASIVE DIAGNOSTICS | Age: 51
End: 2021-11-14
Attending: INTERNAL MEDICINE
Payer: COMMERCIAL

## 2021-11-14 LAB
ATRIAL RATE: 52 BPM
ATRIAL RATE: 56 BPM
CALCULATED P AXIS, ECG09: 50 DEGREES
CALCULATED P AXIS, ECG09: 54 DEGREES
CALCULATED R AXIS, ECG10: 39 DEGREES
CALCULATED R AXIS, ECG10: 54 DEGREES
CALCULATED T AXIS, ECG11: 1 DEGREES
CALCULATED T AXIS, ECG11: 38 DEGREES
DIAGNOSIS, 93000: NORMAL
DIAGNOSIS, 93000: NORMAL
ECHO AO ROOT DIAM: 3.59 CM
ECHO AV AREA PEAK VELOCITY: 2.62 CM2
ECHO AV AREA PEAK VELOCITY: 2.63 CM2
ECHO AV AREA PEAK VELOCITY: 2.63 CM2
ECHO AV AREA PEAK VELOCITY: 2.64 CM2
ECHO AV AREA VTI: 2.59 CM2
ECHO AV AREA/BSA VTI: 1.1 CM2/M2
ECHO AV CUSP MM: 2.02 CM
ECHO AV MEAN GRADIENT: 3.99 MMHG
ECHO AV PEAK GRADIENT: 7.9 MMHG
ECHO AV PEAK GRADIENT: 7.98 MMHG
ECHO AV PEAK VELOCITY: 140.54 CM/S
ECHO AV PEAK VELOCITY: 141.26 CM/S
ECHO AV VTI: 26.57 CM
ECHO LA AREA 4C: 18.91 CM2
ECHO LA MAJOR AXIS: 3.5 CM
ECHO LA MINOR AXIS: 1.53 CM
ECHO LA TO AORTIC ROOT RATIO: 0.95
ECHO LA TO AORTIC ROOT RATIO: 0.95
ECHO LA VOL 2C: 71.07 ML (ref 18–58)
ECHO LA VOL 4C: 45.45 ML (ref 18–58)
ECHO LA VOL BP: 61.49 ML (ref 18–58)
ECHO LA VOL/BSA BIPLANE: 26.85 ML/M2 (ref 16–28)
ECHO LA VOLUME INDEX A2C: 31.03 ML/M2 (ref 16–28)
ECHO LA VOLUME INDEX A4C: 19.85 ML/M2 (ref 16–28)
ECHO LV E' LATERAL VELOCITY: 10.28 CM/S
ECHO LV E' SEPTAL VELOCITY: 9.25 CM/S
ECHO LV INTERNAL DIMENSION DIASTOLIC: 4.65 CM (ref 4.2–5.9)
ECHO LV INTERNAL DIMENSION SYSTOLIC: 3.31 CM
ECHO LV IVSD: 1.49 CM (ref 0.6–1)
ECHO LV IVSS: 2.25 CM
ECHO LV MASS 2D: 277.9 G (ref 88–224)
ECHO LV MASS INDEX 2D: 121.3 G/M2 (ref 49–115)
ECHO LV POSTERIOR WALL DIASTOLIC: 1.43 CM (ref 0.6–1)
ECHO LV POSTERIOR WALL SYSTOLIC: 1.95 CM
ECHO LVOT DIAM: 2.07 CM
ECHO LVOT PEAK GRADIENT: 4.86 MMHG
ECHO LVOT PEAK GRADIENT: 4.9 MMHG
ECHO LVOT PEAK VELOCITY: 110.26 CM/S
ECHO LVOT PEAK VELOCITY: 110.64 CM/S
ECHO LVOT SV: 68.8 ML
ECHO LVOT VTI: 20.52 CM
ECHO MV A VELOCITY: 93.88 CM/S
ECHO MV E DECELERATION TIME (DT): 243.43 MS
ECHO MV E VELOCITY: 76.3 CM/S
ECHO MV E/A RATIO: 0.81
ECHO MV E/E' LATERAL: 7.42
ECHO MV E/E' RATIO (AVERAGED): 7.84
ECHO MV E/E' SEPTAL: 8.25
ECHO PV MAX VELOCITY: 91.83 CM/S
ECHO PV PEAK INSTANTANEOUS GRADIENT SYSTOLIC: 3.38 MMHG
ECHO RV INTERNAL DIMENSION: 4 CM
LVOT MG: 2.67 MMHG
P-R INTERVAL, ECG05: 164 MS
P-R INTERVAL, ECG05: 178 MS
Q-T INTERVAL, ECG07: 412 MS
Q-T INTERVAL, ECG07: 438 MS
QRS DURATION, ECG06: 90 MS
QRS DURATION, ECG06: 92 MS
QTC CALCULATION (BEZET), ECG08: 397 MS
QTC CALCULATION (BEZET), ECG08: 407 MS
VENTRICULAR RATE, ECG03: 52 BPM
VENTRICULAR RATE, ECG03: 56 BPM

## 2021-11-14 PROCEDURE — 99233 SBSQ HOSP IP/OBS HIGH 50: CPT | Performed by: INTERNAL MEDICINE

## 2021-11-14 PROCEDURE — 93306 TTE W/DOPPLER COMPLETE: CPT

## 2021-11-14 PROCEDURE — 65660000000 HC RM CCU STEPDOWN

## 2021-11-14 PROCEDURE — 93306 TTE W/DOPPLER COMPLETE: CPT | Performed by: INTERNAL MEDICINE

## 2021-11-14 PROCEDURE — 2709999900 HC NON-CHARGEABLE SUPPLY

## 2021-11-14 PROCEDURE — 74011250637 HC RX REV CODE- 250/637: Performed by: INTERNAL MEDICINE

## 2021-11-14 RX ADMIN — Medication 10 ML: at 05:12

## 2021-11-14 RX ADMIN — ASPIRIN 81 MG: 81 TABLET, CHEWABLE ORAL at 08:41

## 2021-11-14 RX ADMIN — CLOPIDOGREL BISULFATE 75 MG: 75 TABLET ORAL at 08:41

## 2021-11-14 RX ADMIN — ATORVASTATIN CALCIUM 40 MG: 40 TABLET, FILM COATED ORAL at 21:25

## 2021-11-14 RX ADMIN — Medication 10 ML: at 21:25

## 2021-11-14 NOTE — PROGRESS NOTES
0700: End of Shift Note    Bedside shift change report given to Mely Guzman RN (oncoming nurse) by Ceci Soto (offgoing nurse). Report included the following information SBAR, Kardex, Intake/Output, MAR, Recent Results and Cardiac Rhythm Sinus Acey Finical    Shift worked:  Night     Shift summary and any significant changes:     Patient has been NPO per order     Concerns for physician to address:       Zone phone for oncoming shift:          Activity:  Activity Level: Up ad kike  Number times ambulated in hallways past shift: 0  Number of times OOB to chair past shift: 3    Cardiac:   Cardiac Monitoring: Yes      Cardiac Rhythm: Sinus Saman    Access:   Current line(s): PIV     Genitourinary:   Urinary status: voiding    Respiratory:   O2 Device: None (Room air)  Chronic home O2 use?: NO  Incentive spirometer at bedside: NO     GI:  Last Bowel Movement Date:  (PTA)  Current diet:  DIET ONE TIME MESSAGE  DIET NPO Sips of Water with Meds  Passing flatus: YES  Tolerating current diet: YES       Pain Management:   Patient states pain is manageable on current regimen: YES    Skin:  Shar Score: 23  Interventions: increase time out of bed and PT/OT consult    Patient Safety:  Fall Score:  Total Score: 1  Interventions: gripper socks and pt to call before getting OOB       Length of Stay:  Expected LOS: - - -  Actual LOS: 2      Ivonne Wong

## 2021-11-14 NOTE — PROGRESS NOTES
Hospitalist Progress Note    NAME: Rhett Adame   :  1970   MRN:  [id number]       Assessment / Plan:  NSTEMI    C/w therapeutic lovenox  C/w Aspirin, high intensity statin, Plavix  Hold Coreg d/t Cocaine use  Cardiology following, ongoing discussion on Cath vs stress test. NPO today. Given his active cocaine use and poor compliance, not sure he will take DAPT after PCI/stenting  Nitro as needed for chest pain  HS Tropnin: Uptrending  LDL: 113  No chest pain this AM     Substance abuse  Positive for cocaine which he states he used 3 days ago  Also admits to heroin which he used 2 days ago  Counseled on cessation     Code Status: Full  Surrogate Decision Maker: Wife     DVT Prophylaxis: Lovenox  GI Prophylaxis: not indicated     Baseline: Independent                Estimated discharge date: 11/15  Barriers: Needs Cath vs stress test     Subjective:     Chief Complaint / Reason for Physician Visit  He doesn't have any chest pain this AM      Review of Systems:  Symptom Y/N Comments  Symptom Y/N Comments   Fever/Chills n   Chest Pain n    Poor Appetite n   Edema n    Cough    Abdominal Pain     Sputum    Joint Pain     SOB/LAMA    Pruritis/Rash     Nausea/vomit    Tolerating PT/OT     Diarrhea    Tolerating Diet     Constipation    Other       Could NOT obtain due to:      Objective:     VITALS:   Last 24hrs VS reviewed since prior progress note.  Most recent are:  Patient Vitals for the past 24 hrs:   Temp Pulse Resp BP SpO2   21 0747 97.9 °F (36.6 °C) (!) 52 17 130/83 98 %   21 0212 98 °F (36.7 °C) (!) 57 16 108/89 --   21 2311 98.1 °F (36.7 °C) (!) 57 18 128/85 --   21 1941 98.1 °F (36.7 °C) (!) 57 20 (!) 145/89 --   21 1655 97.8 °F (36.6 °C) 78 19 (!) 142/90 99 %   21 1447 98 °F (36.7 °C) 64 20 132/80 98 %   21 1304 98.3 °F (36.8 °C) (!) 51 18 (!) 128/96 99 %       Intake/Output Summary (Last 24 hours) at 2021 1022  Last data filed at 2021 1457  Gross per 24 hour   Intake 480 ml   Output --   Net 480 ml        I had a face to face encounter and independently examined this patient on 11/14/2021, as outlined below:  PHYSICAL EXAM:  General: WD, WN. Alert, cooperative, no acute distress    EENT:  EOMI. Anicteric sclerae. MMM  Resp:  CTA bilaterally, no wheezing or rales. No accessory muscle use  CV:  Regular  rhythm,  No edema  GI:  Soft, Non distended, Non tender. +Bowel sounds  Neurologic:  Alert and oriented X 3, normal speech,   Psych:   Good insight. Not anxious nor agitated  Skin:  No rashes. No jaundice    Reviewed most current lab test results and cultures  YES  Reviewed most current radiology test results   YES  Review and summation of old records today    NO  Reviewed patient's current orders and MAR    YES  PMH/ reviewed - no change compared to H&P  ________________________________________________________________________  Care Plan discussed with:    Comments   Patient y    Family      RN y    Care Manager     Consultant                        Multidiciplinary team rounds were held today with , nursing, pharmacist and clinical coordinator. Patient's plan of care was discussed; medications were reviewed and discharge planning was addressed. ________________________________________________________________________  Total NON critical care TIME:  36  Minutes    Total CRITICAL CARE TIME Spent:   Minutes non procedure based      Comments   >50% of visit spent in counseling and coordination of care     ________________________________________________________________________  Orly Barrera MD     Procedures: see electronic medical records for all procedures/Xrays and details which were not copied into this note but were reviewed prior to creation of Plan. LABS:  I reviewed today's most current labs and imaging studies.   Pertinent labs include:  Recent Labs     11/13/21  0007 11/12/21  1525 11/12/21  1512   WBC 5.9 5.1 6. 5   HGB 12.1 12.3 13.1   HCT 37.7 40.0 42.6    184 234     Recent Labs     11/13/21  0007 11/12/21  1512    141   K 3.9 3.4*   * 103   CO2 26 29   GLU 94 77   BUN 10 10   CREA 0.72 0.85   CA 9.5 10.0   MG 2.1  --    PHOS 2.8  --    INR  --  1.0       Signed: Waverly Canavan, MD

## 2021-11-14 NOTE — PROGRESS NOTES
11/14/2021 6:39 PM    Admit Date: 11/12/2021    Admit Diagnosis:   [facility] [I[facility]]    Subjective:     Thompson Burkitt denies chest pain but said he got quite short of breath with ambulating in the hallways. Current Facility-Administered Medications   Medication Dose Route Frequency    sodium chloride (NS) flush 5-40 mL  5-40 mL IntraVENous Q8H    sodium chloride (NS) flush 5-40 mL  5-40 mL IntraVENous PRN    nitroglycerin (NITROSTAT) tablet 0.4 mg  0.4 mg SubLINGual Q5MIN PRN    atorvastatin (LIPITOR) tablet 40 mg  40 mg Oral QHS    aspirin chewable tablet 81 mg  81 mg Oral DAILY    clopidogreL (PLAVIX) tablet 75 mg  75 mg Oral DAILY    [Held by provider] carvediloL (COREG) tablet 3.125 mg  3.125 mg Oral BID WITH MEALS         Objective:      Physical Exam:    Visit Vitals  BP (!) 145/89   Pulse 65   Temp 98.1 °F (36.7 °C)   Resp 17   Ht 6' 3\" (1.905 m)   Wt 220 lb 7.4 oz (100 kg)   SpO2 99%   BMI 27.56 kg/m²     Gen:  NAD  Mental Status - Alert. General Appearance - Not in acute distress. Chest and Lung Exam   Inspection: Accessory muscles - No use of accessory muscles in breathing. Auscultation:   Breath sounds: - Normal.   Cardiovascular   Inspection: Jugular vein - Bilateral - Inspection Normal.   Palpation/Percussion:   Apical Impulse: - Normal.   Auscultation: Rhythm - Regular. Heart Sounds - S1 WNL and S2 WNL. No S3 or S4. Murmurs & Other Heart Sounds: Auscultation of the heart reveals - No Murmurs. Peripheral Vascular   Upper Extremity: Inspection - Bilateral - No Cyanotic nailbeds or Digital clubbing. Lower Extremity:   Palpation: Edema - Bilateral - No edema. Abdomen:   Soft, non-tender, bowel sounds are active.   Neuro: A&O times 3, CN and motor grossly WNL    Data Review:   Recent Labs     11/13/21  0007 11/12/21  1525 11/12/21  1512   WBC 5.9 5.1 6.5   HGB 12.1 12.3 13.1   HCT 37.7 40.0 42.6    184 234     Recent Labs     11/13/21  0007 11/12/21  1512    141   K 3.9 3.4*   * 103   CO2 26 29   GLU 94 77   BUN 10 10   CREA 0.72 0.85   CA 9.5 10.0   MG 2.1  --    PHOS 2.8  --    INR  --  1.0       No results for input(s): TROIQ, CPK, CKMB in the last 72 hours. No intake or output data in the 24 hours ending 11/14/21 0859     Telemetry: normal sinus rhythm  Echo noted normal LVEF and no significant valvular pathology    Assessment:     Principal Problem:    [facility]acute coronary syndrome) ([facility] (11/12/2021)    Active Problems:    [facility]ysubstance abuse ([facility] (11/13/2021)      Noncompliance with medication regimen (11/13/2021)      CAD (coronary artery disease), native coronary artery (11/13/2021)      S/P PTCA (percutaneous transluminal coronary angioplasty) (11/13/2021)      Mixed hyperlipidemia (11/13/2021)        Plan:     45 y.o. with multiple CV risk factors, admitted with unstable angina pectoris/mild troponin elevation in the setting of recent cocaine use:  · Telemetry   · ASA, plavix (continue for now, started by hospitalist), statin. · Hold beta-blocker at this time due to pulse in the 50s at times and recent cocaine abuse  · I have discussed the risks and benefits of cardiac catheterization +/- percutaneous coronary intervention if necessary. · I discussed with the patient the possibility of cardiac catheterization and PCI and explained that it is critically important to be compliant with DAPT for at least a year after stenting. I told him I am worried about his cocaine and heroin use and he admits he uses both of them frequently for years. He admits that he has a history of forgetting medications and being noncompliant on a recurrent basis. I told him if we are to consider catheterization with PCI, he must assure me that he will be 100% compliant with DAPT. He assures me today that he will be 100% compliant.     · I have discussed the risks and benefits of cardiac catheterization plus or minus PCI with the patient who expressed understanding and wishes to proceed. He expresses understanding of the risk of myocardial infarction and death if he uses illegal drugs and or is noncompliant with dual antiplatelet therapy in the future. · The patient knows to call a nurse immediately if any recurrence/ progression of symptoms.

## 2021-11-14 NOTE — PROGRESS NOTES
Problem: Falls - Risk of  Goal: *Absence of Falls  Description: Document Cheko Jonas Fall Risk and appropriate interventions in the flowsheet.   Outcome: Progressing Towards Goal  Note: Fall Risk Interventions:            Medication Interventions: Evaluate medications/consider consulting pharmacy, Patient to call before getting OOB, Teach patient to arise slowly

## 2021-11-15 LAB
ATRIAL RATE: 53 BPM
ATRIAL RATE: 56 BPM
CALCULATED P AXIS, ECG09: 54 DEGREES
CALCULATED P AXIS, ECG09: 56 DEGREES
CALCULATED R AXIS, ECG10: 44 DEGREES
CALCULATED R AXIS, ECG10: 51 DEGREES
CALCULATED T AXIS, ECG11: 75 DEGREES
CALCULATED T AXIS, ECG11: 9 DEGREES
DIAGNOSIS, 93000: NORMAL
DIAGNOSIS, 93000: NORMAL
P-R INTERVAL, ECG05: 162 MS
P-R INTERVAL, ECG05: 168 MS
Q-T INTERVAL, ECG07: 412 MS
Q-T INTERVAL, ECG07: 438 MS
QRS DURATION, ECG06: 86 MS
QRS DURATION, ECG06: 90 MS
QTC CALCULATION (BEZET), ECG08: 397 MS
QTC CALCULATION (BEZET), ECG08: 410 MS
VENTRICULAR RATE, ECG03: 53 BPM
VENTRICULAR RATE, ECG03: 56 BPM

## 2021-11-15 PROCEDURE — 74011250636 HC RX REV CODE- 250/636: Performed by: STUDENT IN AN ORGANIZED HEALTH CARE EDUCATION/TRAINING PROGRAM

## 2021-11-15 PROCEDURE — 65660000000 HC RM CCU STEPDOWN

## 2021-11-15 PROCEDURE — 74011000250 HC RX REV CODE- 250: Performed by: STUDENT IN AN ORGANIZED HEALTH CARE EDUCATION/TRAINING PROGRAM

## 2021-11-15 PROCEDURE — 74011250637 HC RX REV CODE- 250/637: Performed by: STUDENT IN AN ORGANIZED HEALTH CARE EDUCATION/TRAINING PROGRAM

## 2021-11-15 PROCEDURE — 74011250637 HC RX REV CODE- 250/637: Performed by: INTERNAL MEDICINE

## 2021-11-15 PROCEDURE — 99233 SBSQ HOSP IP/OBS HIGH 50: CPT | Performed by: INTERNAL MEDICINE

## 2021-11-15 PROCEDURE — 74011250636 HC RX REV CODE- 250/636: Performed by: NURSE PRACTITIONER

## 2021-11-15 PROCEDURE — 74011250637 HC RX REV CODE- 250/637: Performed by: NURSE PRACTITIONER

## 2021-11-15 RX ORDER — ENOXAPARIN SODIUM 100 MG/ML
40 INJECTION SUBCUTANEOUS EVERY 24 HOURS
Status: DISCONTINUED | OUTPATIENT
Start: 2021-11-15 | End: 2021-11-18

## 2021-11-15 RX ORDER — MORPHINE SULFATE 2 MG/ML
2 INJECTION, SOLUTION INTRAMUSCULAR; INTRAVENOUS ONCE
Status: COMPLETED | OUTPATIENT
Start: 2021-11-15 | End: 2021-11-15

## 2021-11-15 RX ORDER — LORAZEPAM 2 MG/ML
1 INJECTION INTRAMUSCULAR ONCE
Status: COMPLETED | OUTPATIENT
Start: 2021-11-15 | End: 2021-11-15

## 2021-11-15 RX ORDER — PROMETHAZINE HYDROCHLORIDE 25 MG/1
25 TABLET ORAL
Status: DISCONTINUED | OUTPATIENT
Start: 2021-11-15 | End: 2021-11-19 | Stop reason: HOSPADM

## 2021-11-15 RX ORDER — DIAZEPAM 5 MG/1
10 TABLET ORAL
Status: COMPLETED | OUTPATIENT
Start: 2021-11-15 | End: 2021-11-16

## 2021-11-15 RX ORDER — BUPRENORPHINE HYDROCHLORIDE 0.32 MG/ML
0.3 INJECTION INTRAMUSCULAR; INTRAVENOUS
Status: DISCONTINUED | OUTPATIENT
Start: 2021-11-15 | End: 2021-11-19 | Stop reason: HOSPADM

## 2021-11-15 RX ORDER — DIAZEPAM 5 MG/1
10 TABLET ORAL ONCE
Status: COMPLETED | OUTPATIENT
Start: 2021-11-15 | End: 2021-11-15

## 2021-11-15 RX ORDER — LORAZEPAM 2 MG/ML
1 INJECTION INTRAMUSCULAR
Status: DISCONTINUED | OUTPATIENT
Start: 2021-11-15 | End: 2021-11-19 | Stop reason: HOSPADM

## 2021-11-15 RX ADMIN — ATORVASTATIN CALCIUM 40 MG: 40 TABLET, FILM COATED ORAL at 21:48

## 2021-11-15 RX ADMIN — PROMETHAZINE HYDROCHLORIDE 25 MG: 25 TABLET ORAL at 08:18

## 2021-11-15 RX ADMIN — MORPHINE SULFATE 2 MG: 2 INJECTION, SOLUTION INTRAMUSCULAR; INTRAVENOUS at 07:32

## 2021-11-15 RX ADMIN — LORAZEPAM 1 MG: 2 INJECTION INTRAMUSCULAR; INTRAVENOUS at 09:25

## 2021-11-15 RX ADMIN — PROCHLORPERAZINE EDISYLATE 10 MG: 5 INJECTION INTRAMUSCULAR; INTRAVENOUS at 11:54

## 2021-11-15 RX ADMIN — LORAZEPAM 1 MG: 2 INJECTION INTRAMUSCULAR; INTRAVENOUS at 13:52

## 2021-11-15 RX ADMIN — ENOXAPARIN SODIUM 40 MG: 100 INJECTION SUBCUTANEOUS at 11:54

## 2021-11-15 RX ADMIN — Medication 10 ML: at 21:49

## 2021-11-15 RX ADMIN — DIAZEPAM 10 MG: 5 TABLET ORAL at 21:49

## 2021-11-15 RX ADMIN — ASPIRIN 81 MG: 81 TABLET, CHEWABLE ORAL at 08:18

## 2021-11-15 RX ADMIN — Medication 10 ML: at 04:50

## 2021-11-15 RX ADMIN — DIAZEPAM 10 MG: 5 TABLET ORAL at 14:17

## 2021-11-15 RX ADMIN — LORAZEPAM 1 MG: 2 INJECTION INTRAMUSCULAR; INTRAVENOUS at 04:49

## 2021-11-15 RX ADMIN — CLOPIDOGREL BISULFATE 75 MG: 75 TABLET ORAL at 08:18

## 2021-11-15 RX ADMIN — BUPRENORPHINE HYDROCHLORIDE 0.3 MG: 0.32 INJECTION INTRAMUSCULAR; INTRAVENOUS at 10:01

## 2021-11-15 NOTE — PROGRESS NOTES
Hospitalist Progress Note    NAME: Rhett Adame   :  1970   MRN:  [id number]       Assessment / Plan:  NSTEMI    C/w Aspirin, high intensity statin, Plavix  Hold Coreg d/t Cocaine use  HS Tropnin: elevated. LDL: 113  Planned for Cath today however cancelled d/t heroin withdrawls, possible tomorrow if better.     Substance abuse  Heroin withdrawls  Positive for cocaine which he states he used 3 days PTA  Also admits to heroin which he used 2 days PTA  Started having withdrawal symptoms since early morning today, is nauseaous, feels cold, Compazine PRN, Buprenorphine PRN     Code Status: Full  Surrogate Decision Maker: Wife     DVT Prophylaxis: Lovenox  GI Prophylaxis: not indicated     Baseline: Independent                Estimated discharge date:   Barriers: Heroin withdrawls, needs cath     Subjective:     Chief Complaint / Reason for Physician Visit  He doesn't have any chest pain this AM  He is having withdrawal symptoms this AM      Review of Systems:  Symptom Y/N Comments  Symptom Y/N Comments   Fever/Chills n   Chest Pain n    Poor Appetite n   Edema n    Cough    Abdominal Pain     Sputum    Joint Pain     SOB/LAMA    Pruritis/Rash     Nausea/vomit    Tolerating PT/OT     Diarrhea    Tolerating Diet     Constipation    Other       Could NOT obtain due to:      Objective:     VITALS:   Last 24hrs VS reviewed since prior progress note.  Most recent are:  Patient Vitals for the past 24 hrs:   Temp Pulse Resp BP SpO2   11/15/21 0732 98.2 °F (36.8 °C) 72 18 (!) 144/91 97 %   11/15/21 0305 98.3 °F (36.8 °C) 61 16 (!) 149/95 100 %   21 2247 98.1 °F (36.7 °C) (!) 57 16 136/87 98 %   21 2018 98.2 °F (36.8 °C) (!) 49 16 125/89 99 %   21 1500 98.1 °F (36.7 °C) 65 17 (!) 145/89 99 %   21 1255 -- -- -- 130/83 --     No intake or output data in the 24 hours ending 11/15/21 1102     I had a face to face encounter and independently examined this patient on 11/15/2021, as outlined below:  PHYSICAL EXAM:  General: WD, WN. Alert, cooperative, no acute distress    EENT:  EOMI. Anicteric sclerae. MMM  Resp:  CTA bilaterally, no wheezing or rales. No accessory muscle use  CV:  Regular  rhythm,  No edema  GI:  Soft, Non distended, Non tender. +Bowel sounds  Neurologic:  Alert and oriented X 3, normal speech,   Psych:   Good insight. Not anxious nor agitated  Skin:  No rashes. No jaundice    Reviewed most current lab test results and cultures  YES  Reviewed most current radiology test results   YES  Review and summation of old records today    NO  Reviewed patient's current orders and MAR    YES  PMH/SH reviewed - no change compared to H&P  ________________________________________________________________________  Care Plan discussed with:    Comments   Patient y    Family      RN y    Care Manager     Consultant                        Multidiciplinary team rounds were held today with , nursing, pharmacist and clinical coordinator. Patient's plan of care was discussed; medications were reviewed and discharge planning was addressed. ________________________________________________________________________  Total NON critical care TIME:  36  Minutes    Total CRITICAL CARE TIME Spent:   Minutes non procedure based      Comments   >50% of visit spent in counseling and coordination of care     ________________________________________________________________________  Aguila Gramajo MD     Procedures: see electronic medical records for all procedures/Xrays and details which were not copied into this note but were reviewed prior to creation of Plan. LABS:  I reviewed today's most current labs and imaging studies.   Pertinent labs include:  Recent Labs     11/13/21  0007 11/12/21  1525 11/12/21  1512   WBC 5.9 5.1 6.5   HGB 12.1 12.3 13.1   HCT 37.7 40.0 42.6    184 234     Recent Labs     11/13/21  0007 11/12/21  1512    141   K 3.9 3.4*   * 103   CO2 26 29   GLU 94 77   BUN 10 10   CREA 0.72 0.85   CA 9.5 10.0   MG 2.1  --    PHOS 2.8  --    INR  --  1.0       Signed: Dasha Dominguez MD

## 2021-11-15 NOTE — PROGRESS NOTES
11/15/2021 6:39 PM    Admit Date: 11/12/2021    Admit Diagnosis:   [facility] [I24.9]    Subjective:     Kathi Banks is nauseated due to withdrawal.  Nurse practitioner Elen Lake did not perform assessment due to his discomfort. I, Dr. Thomas Wilks did. Current Facility-Administered Medications   Medication Dose Route Frequency    LORazepam (ATIVAN) injection 1 mg  1 mg IntraVENous Q4H PRN    promethazine (PHENERGAN) tablet 25 mg  25 mg Oral Q6H PRN    buPRENORphine (BUPRENEX) injection 0.3 mg  0.3 mg IntraVENous Q6H PRN    prochlorperazine (COMPAZINE) with saline injection 10 mg  10 mg IntraVENous Q6H PRN    enoxaparin (LOVENOX) injection 40 mg  40 mg SubCUTAneous Q24H    sodium chloride (NS) flush 5-40 mL  5-40 mL IntraVENous Q8H    sodium chloride (NS) flush 5-40 mL  5-40 mL IntraVENous PRN    nitroglycerin (NITROSTAT) tablet 0.4 mg  0.4 mg SubLINGual Q5MIN PRN    atorvastatin (LIPITOR) tablet 40 mg  40 mg Oral QHS    aspirin chewable tablet 81 mg  81 mg Oral DAILY    clopidogreL (PLAVIX) tablet 75 mg  75 mg Oral DAILY         Objective:      Physical Exam:    Visit Vitals  BP (!) 140/79 (BP 1 Location: Left upper arm, BP Patient Position: At rest)   Pulse 63   Temp 98.5 °F (36.9 °C)   Resp 18   Ht 6' 3\" (1.905 m)   Wt 220 lb 7.4 oz (100 kg)   SpO2 96%   BMI 27.56 kg/m²     Gen:  AAM iin mild distress due to withdrawal symptoms  Mental Status - Alert. Chest and Lung Exam   Inspection: Accessory muscles - tachypneic lungs clear to auscultation by Dr. Thomas Wilks  Cardiovascular not performed by nurse practitioner, but regular rhythm normal rate no murmur by Dr. Thomas Wilks  Peripheral Vascular   Upper Extremity: Inspection - Bilateral - No Cyanotic nailbeds or Digital clubbing. Lower Extremity:   Palpation: Edema - Bilateral - No edema.   Abdomen:   not performed  Neuro: A&O times 3, very anxious, stressed     Data Review:   Recent Labs     11/13/21  0005 11/12/21  1525 11/12/21  1512   WBC 5.9 5.1 6.5   HGB 12.1 12.3 13.1   HCT 37.7 40.0 42.6    184 234     Recent Labs     11/13/21  0007 11/12/21  1512    141   K 3.9 3.4*   * 103   CO2 26 29   GLU 94 77   BUN 10 10   CREA 0.72 0.85   CA 9.5 10.0   MG 2.1  --    PHOS 2.8  --    INR  --  1.0       No results for input(s): TROIQ, CPK, CKMB in the last 72 hours. No intake or output data in the 24 hours ending 11/15/21 1443     Telemetry: ST  Echo noted normal LVEF and no significant valvular pathology    Assessment:     Principal Problem:    [facility] (11/12/2021)    Active Problems:    Polysubstance abuse ([facility] (11/13/2021)      Noncompliance with medication regimen (11/13/2021)      CAD (coronary artery disease), native coronary artery (11/13/2021)      S/P PTCA (percutaneous transluminal coronary angioplasty) (11/13/2021)      Mixed hyperlipidemia (11/13/2021)        Plan:     45 y.o. with multiple CV risk factors, admitted with unstable angina pectoris/mild troponin elevation in the setting of recent cocaine use:  · Telemetry   · ASA, plavix (continue for now, started by hospitalist), statin. · Hold beta-blocker at this time due to pulse in the 50s at times and recent cocaine abuse  · Initially planned for cardiac cath today. He is now experiencing withdrawal from narcotics/heroin  · Once he recovers will address with the patient. · Dr. Selvin Beckwith has discussed with the patient the possibility of cardiac catheterization and PCI and explained that it is critically important to be compliant with DAPT for at least a year after stenting. Narcotic withdrawal per internal medicine    Mixed hyperlipidemia continue statin    Nobie [facility]  Cardiology     Patient seen and examined by me with the above nurse practitioner.   I personally performed all components of the history, physical, and medical decision making and agree with the assessment and plan with minor modifications as noted. Today the patient went into narcotic/heroin withdrawal.  No chest pain    General PE  Gen:  NAD, anxious, intermittent nausea and vomiting per nurse  Mental Status - Alert. General Appearance - Not in acute distress. HEENT:  PERRL, no carotid bruits or JVD  Chest and Lung Exam   Inspection: Accessory muscles - No use of accessory muscles in breathing. Auscultation:   Breath sounds: - Normal.   Cardiovascular   Inspection: Jugular vein - Bilateral - Inspection Normal.   Palpation/Percussion:   Apical Impulse: - Normal.   Auscultation: Rhythm - Regular. Heart Sounds - S1 WNL and S2 WNL. No S3 or S4. Murmurs & Other Heart Sounds: Auscultation of the heart reveals - No Murmurs. Peripheral Vascular   Upper Extremity: Inspection - Bilateral - No Cyanotic nailbeds or Digital clubbing. Lower Extremity:   Palpation: Edema - Bilateral - No edema. Abdomen:   Soft, non-tender, bowel sounds are active. Neuro: A&O times 3, CN and motor grossly WNL    Continue with medical management. Delay catheterization until heroin withdrawal controlled. Emphasized that he should take this opportunity to come clean from drugs and again that when we proceed with catheterization he must be absolutely compliant with DAPT if he gets a stent.

## 2021-11-15 NOTE — PROGRESS NOTES
Received notification from bedside RN about patient with regards to: reports agitation as patient is withrawing from heroin, requesting medication for relief  VS: /95, HR 61, RR 16, o2 sat 100% on RA    Intervention given: Ativan 1 mg IV x 1 dose ordered

## 2021-11-15 NOTE — PROGRESS NOTES
Perfect served on call physician per patient request for medication for the withdraws. 0500 medication given as ordered for patient c/o withdraws, see mar.

## 2021-11-15 NOTE — PROGRESS NOTES
Dr. Norbert Ceja notified of patient c/o withdrawing from heroin use , new orders received and carried out. On coming [id number] "[employer] updated on new orders.

## 2021-11-15 NOTE — PROGRESS NOTES
Patient up to the bathroom had episode of Vomiting that he is stating \" from [facility] of withdrawal,\" requesting  Medication for his synptoms. Will page physician regarding this.

## 2021-11-16 LAB
ACT BLD: 544 SECS (ref 79–138)
ALBUMIN SERPL-MCNC: 4 G/DL (ref 3.5–5)
ALBUMIN/GLOB SERPL: 0.9 {RATIO} (ref 1.1–2.2)
ALP SERPL-CCNC: 84 U/L (ref 45–117)
ALT SERPL-CCNC: 60 U/L (ref 12–78)
ANION GAP SERPL CALC-SCNC: 8 MMOL/L (ref 5–15)
AST SERPL-CCNC: 55 U/L (ref 15–37)
BILIRUB SERPL-MCNC: 0.7 MG/DL (ref 0.2–1)
BUN SERPL-MCNC: 13 MG/DL (ref 6–20)
BUN/CREAT SERPL: 14 (ref 12–20)
CALCIUM SERPL-MCNC: 10.6 MG/DL (ref 8.5–10.1)
CHLORIDE SERPL-SCNC: 108 MMOL/L (ref 97–108)
CO2 SERPL-SCNC: 23 MMOL/L (ref 21–32)
CREAT SERPL-MCNC: 0.92 MG/DL (ref 0.7–1.3)
ERYTHROCYTE [DISTWIDTH] IN BLOOD BY AUTOMATED COUNT: 14.3 % (ref 11.5–14.5)
GLOBULIN SER CALC-MCNC: 4.4 G/DL (ref 2–4)
GLUCOSE SERPL-MCNC: 107 MG/DL (ref 65–100)
HCT VFR BLD AUTO: 42.7 % (ref 36.6–50.3)
HGB BLD-MCNC: 14 G/DL (ref 12.1–17)
MCH RBC QN AUTO: 23.9 PG (ref 26–34)
MCHC RBC AUTO-ENTMCNC: 32.8 G/DL (ref 30–36.5)
MCV RBC AUTO: 73 FL (ref 80–99)
NRBC # BLD: 0 K/UL (ref 0–0.01)
NRBC BLD-RTO: 0 PER 100 WBC
PLATELET # BLD AUTO: 239 K/UL (ref 150–400)
PMV BLD AUTO: 10.5 FL (ref 8.9–12.9)
POTASSIUM SERPL-SCNC: 3.2 MMOL/L (ref 3.5–5.1)
PROT SERPL-MCNC: 8.4 G/DL (ref 6.4–8.2)
RBC # BLD AUTO: 5.85 M/UL (ref 4.1–5.7)
SODIUM SERPL-SCNC: 139 MMOL/L (ref 136–145)
WBC # BLD AUTO: 6.8 K/UL (ref 4.1–11.1)

## 2021-11-16 PROCEDURE — 99233 SBSQ HOSP IP/OBS HIGH 50: CPT | Performed by: INTERNAL MEDICINE

## 2021-11-16 PROCEDURE — C1769 GUIDE WIRE: HCPCS | Performed by: INTERNAL MEDICINE

## 2021-11-16 PROCEDURE — 77030010221 HC SPLNT WR POS TELE -B: Performed by: INTERNAL MEDICINE

## 2021-11-16 PROCEDURE — 74011000636 HC RX REV CODE- 636: Performed by: INTERNAL MEDICINE

## 2021-11-16 PROCEDURE — 74011250637 HC RX REV CODE- 250/637: Performed by: INTERNAL MEDICINE

## 2021-11-16 PROCEDURE — 77030004549 HC CATH ANGI DX PRF MRTM -A: Performed by: INTERNAL MEDICINE

## 2021-11-16 PROCEDURE — C1887 CATHETER, GUIDING: HCPCS | Performed by: INTERNAL MEDICINE

## 2021-11-16 PROCEDURE — 74011250636 HC RX REV CODE- 250/636: Performed by: STUDENT IN AN ORGANIZED HEALTH CARE EDUCATION/TRAINING PROGRAM

## 2021-11-16 PROCEDURE — 93458 L HRT ARTERY/VENTRICLE ANGIO: CPT | Performed by: INTERNAL MEDICINE

## 2021-11-16 PROCEDURE — 77030042317 HC BND COMPR HEMSTAT -B: Performed by: INTERNAL MEDICINE

## 2021-11-16 PROCEDURE — 99153 MOD SED SAME PHYS/QHP EA: CPT | Performed by: INTERNAL MEDICINE

## 2021-11-16 PROCEDURE — 85027 COMPLETE CBC AUTOMATED: CPT

## 2021-11-16 PROCEDURE — 65660000000 HC RM CCU STEPDOWN

## 2021-11-16 PROCEDURE — 92920 PRQ TRLUML C ANGIOP 1ART&/BR: CPT | Performed by: INTERNAL MEDICINE

## 2021-11-16 PROCEDURE — 77030019697 HC SYR ANGI INFL MRTM -B: Performed by: INTERNAL MEDICINE

## 2021-11-16 PROCEDURE — 74011000258 HC RX REV CODE- 258: Performed by: INTERNAL MEDICINE

## 2021-11-16 PROCEDURE — C1725 CATH, TRANSLUMIN NON-LASER: HCPCS | Performed by: INTERNAL MEDICINE

## 2021-11-16 PROCEDURE — B2151ZZ FLUOROSCOPY OF LEFT HEART USING LOW OSMOLAR CONTRAST: ICD-10-PCS | Performed by: INTERNAL MEDICINE

## 2021-11-16 PROCEDURE — 74011000250 HC RX REV CODE- 250: Performed by: INTERNAL MEDICINE

## 2021-11-16 PROCEDURE — 77030019698 HC SYR ANGI MDLON MRTM -A: Performed by: INTERNAL MEDICINE

## 2021-11-16 PROCEDURE — 4A023N7 MEASUREMENT OF CARDIAC SAMPLING AND PRESSURE, LEFT HEART, PERCUTANEOUS APPROACH: ICD-10-PCS | Performed by: INTERNAL MEDICINE

## 2021-11-16 PROCEDURE — 85347 COAGULATION TIME ACTIVATED: CPT

## 2021-11-16 PROCEDURE — 99152 MOD SED SAME PHYS/QHP 5/>YRS: CPT | Performed by: INTERNAL MEDICINE

## 2021-11-16 PROCEDURE — 74011000250 HC RX REV CODE- 250: Performed by: STUDENT IN AN ORGANIZED HEALTH CARE EDUCATION/TRAINING PROGRAM

## 2021-11-16 PROCEDURE — 74011250637 HC RX REV CODE- 250/637: Performed by: STUDENT IN AN ORGANIZED HEALTH CARE EDUCATION/TRAINING PROGRAM

## 2021-11-16 PROCEDURE — 2709999900 HC NON-CHARGEABLE SUPPLY: Performed by: INTERNAL MEDICINE

## 2021-11-16 PROCEDURE — 77030018729 HC ELECTRD DEFIB PAD CARD -B: Performed by: INTERNAL MEDICINE

## 2021-11-16 PROCEDURE — 74011250636 HC RX REV CODE- 250/636: Performed by: INTERNAL MEDICINE

## 2021-11-16 PROCEDURE — 74011250637 HC RX REV CODE- 250/637: Performed by: NURSE PRACTITIONER

## 2021-11-16 PROCEDURE — 77030015766: Performed by: INTERNAL MEDICINE

## 2021-11-16 PROCEDURE — 77030008543 HC TBNG MON PRSS MRTM -A: Performed by: INTERNAL MEDICINE

## 2021-11-16 PROCEDURE — C1894 INTRO/SHEATH, NON-LASER: HCPCS | Performed by: INTERNAL MEDICINE

## 2021-11-16 PROCEDURE — B2111ZZ FLUOROSCOPY OF MULTIPLE CORONARY ARTERIES USING LOW OSMOLAR CONTRAST: ICD-10-PCS | Performed by: INTERNAL MEDICINE

## 2021-11-16 PROCEDURE — 80053 COMPREHEN METABOLIC PANEL: CPT

## 2021-11-16 PROCEDURE — 36415 COLL VENOUS BLD VENIPUNCTURE: CPT

## 2021-11-16 RX ORDER — VERAPAMIL HYDROCHLORIDE 2.5 MG/ML
INJECTION, SOLUTION INTRAVENOUS AS NEEDED
Status: DISCONTINUED | OUTPATIENT
Start: 2021-11-16 | End: 2021-11-16 | Stop reason: HOSPADM

## 2021-11-16 RX ORDER — HEPARIN SODIUM 200 [USP'U]/100ML
INJECTION, SOLUTION INTRAVENOUS
Status: COMPLETED | OUTPATIENT
Start: 2021-11-16 | End: 2021-11-16

## 2021-11-16 RX ORDER — HEPARIN SODIUM 1000 [USP'U]/ML
INJECTION, SOLUTION INTRAVENOUS; SUBCUTANEOUS AS NEEDED
Status: DISCONTINUED | OUTPATIENT
Start: 2021-11-16 | End: 2021-11-16 | Stop reason: HOSPADM

## 2021-11-16 RX ORDER — FENTANYL CITRATE 50 UG/ML
INJECTION, SOLUTION INTRAMUSCULAR; INTRAVENOUS AS NEEDED
Status: DISCONTINUED | OUTPATIENT
Start: 2021-11-16 | End: 2021-11-16 | Stop reason: HOSPADM

## 2021-11-16 RX ORDER — MIDAZOLAM HYDROCHLORIDE 1 MG/ML
INJECTION, SOLUTION INTRAMUSCULAR; INTRAVENOUS AS NEEDED
Status: DISCONTINUED | OUTPATIENT
Start: 2021-11-16 | End: 2021-11-16 | Stop reason: HOSPADM

## 2021-11-16 RX ORDER — LIDOCAINE HYDROCHLORIDE 10 MG/ML
INJECTION, SOLUTION EPIDURAL; INFILTRATION; INTRACAUDAL; PERINEURAL AS NEEDED
Status: DISCONTINUED | OUTPATIENT
Start: 2021-11-16 | End: 2021-11-16 | Stop reason: HOSPADM

## 2021-11-16 RX ORDER — LORAZEPAM 2 MG/ML
INJECTION INTRAMUSCULAR AS NEEDED
Status: DISCONTINUED | OUTPATIENT
Start: 2021-11-16 | End: 2021-11-16 | Stop reason: HOSPADM

## 2021-11-16 RX ORDER — GUAIFENESIN 100 MG/5ML
LIQUID (ML) ORAL AS NEEDED
Status: DISCONTINUED | OUTPATIENT
Start: 2021-11-16 | End: 2021-11-16 | Stop reason: HOSPADM

## 2021-11-16 RX ORDER — CLOPIDOGREL BISULFATE 75 MG/1
TABLET ORAL AS NEEDED
Status: DISCONTINUED | OUTPATIENT
Start: 2021-11-16 | End: 2021-11-16 | Stop reason: HOSPADM

## 2021-11-16 RX ADMIN — PROCHLORPERAZINE EDISYLATE 10 MG: 5 INJECTION INTRAMUSCULAR; INTRAVENOUS at 06:16

## 2021-11-16 RX ADMIN — DIAZEPAM 10 MG: 5 TABLET ORAL at 06:23

## 2021-11-16 RX ADMIN — BUPRENORPHINE HYDROCHLORIDE 0.3 MG: 0.32 INJECTION INTRAMUSCULAR; INTRAVENOUS at 08:23

## 2021-11-16 RX ADMIN — Medication 10 ML: at 06:17

## 2021-11-16 RX ADMIN — LORAZEPAM 1 MG: 2 INJECTION INTRAMUSCULAR; INTRAVENOUS at 21:34

## 2021-11-16 RX ADMIN — POTASSIUM BICARBONATE 40 MEQ: 782 TABLET, EFFERVESCENT ORAL at 17:35

## 2021-11-16 RX ADMIN — Medication 10 ML: at 21:28

## 2021-11-16 RX ADMIN — Medication 10 ML: at 15:42

## 2021-11-16 RX ADMIN — ATORVASTATIN CALCIUM 40 MG: 40 TABLET, FILM COATED ORAL at 21:28

## 2021-11-16 NOTE — ED NOTES
Patient  given copy of dc instructions and 0 paper script(s) and 2 electronic scripts. Patient  verbalized understanding of instructions and script (s). Patient given a current medication reconciliation form and verbalized understanding of their medications. Patient  verbalized understanding of the importance of discussing medications with  his or her physician or clinic they will be following up with. Patient alert and oriented and in no acute distress. Patient offered wheelchair from treatment area to hospital entrance, patient declined wheelchair. Pt called to notify PCP Cayetano who pt last saw on 11/10/21, that her symptoms are getting worse. Pt reports she is still coughing, has a headache, mucus build up in her eyes in the morning.    Pt stated she was told by PCP that she would call in a script for an antibiotic if the symptoms got worse. Pt is calling for the script to be sent to the pharmacy.

## 2021-11-16 NOTE — PROGRESS NOTES
Based on COW score and patient c/o sl agitation, valium 10 mg given. 11/16/21 0616 Patient up to the bathroom vomited light green colored emesis, Compazine 10mg given IVP.    0623 P. O. valium given for S $ S withdrawal .

## 2021-11-16 NOTE — PROGRESS NOTES
Hospitalist Progress Note    NAME: Tabitha Aguilar   :  1970   MRN:  [id number]       Assessment / Plan:  NSTEMI    C/w Aspirin, high intensity statin, Plavix  Hold Coreg d/t Cocaine use  HS Tropnin: elevated. LDL: 113  Cardiac cath cancelled on 11/15 d/t withdrawls, rescheduled for today     Substance abuse  Heroin withdrawls  Positive for cocaine which he states he used 3 days PTA  Also admits to heroin which he used 2 days PTA  Withdrawal better today, c/w PRN Compazine, valium, buprenorphine  Psych evaluated recommended Detox admission which he agrees     Code Status: Full  Surrogate Decision Maker: Wife     DVT Prophylaxis: Lovenox  GI Prophylaxis: not indicated     Baseline: Independent                Estimated discharge date:   Barriers: Cath today, needs detox     Subjective:     Chief Complaint / Reason for Physician Visit  He doesn't have any chest pain this AM  His withdrawal symptoms are better today      Review of Systems:  Symptom Y/N Comments  Symptom Y/N Comments   Fever/Chills n   Chest Pain n    Poor Appetite n   Edema n    Cough    Abdominal Pain     Sputum    Joint Pain     SOB/LAMA    Pruritis/Rash     Nausea/vomit    Tolerating PT/OT     Diarrhea    Tolerating Diet     Constipation    Other       Could NOT obtain due to:      Objective:     VITALS:   Last 24hrs VS reviewed since prior progress note.  Most recent are:  Patient Vitals for the past 24 hrs:   Temp Pulse Resp BP SpO2   21 1205 -- 67 15 (!) 138/91 99 %   21 1200 97.9 °F (36.6 °C) 72 16 (!) 152/88 97 %   21 1155 -- 97 16 (!) 147/82 100 %   21 1150 -- 71 16 (!) 147/82 100 %   21 1145 -- 77 16 129/88 98 %   21 1130 -- 67 16 128/87 99 %   21 1115 -- 63 18 (!) 135/92 99 %   21 1100 -- 63 18 132/87 98 %   21 1045 -- 67 16 127/86 98 %   21 1030 -- 63 16 128/87 98 %   21 1007 97.5 °F (36.4 °C) (!) 58 16 123/79 97 %   21 0717 98.6 °F (37 °C) 87 16 127/73 100 %   11/16/21 0359 98.1 °F (36.7 °C) 67 18 (!) 144/96 96 %   11/15/21 2358 99 °F (37.2 °C) 68 16 119/79 100 %   11/15/21 1957 -- -- -- -- 100 %   11/15/21 1914 98.8 °F (37.1 °C) 76 18 119/78 100 %     No intake or output data in the 24 hours ending 11/16/21 1601     I had a face to face encounter and independently examined this patient on 11/16/2021, as outlined below:  PHYSICAL EXAM:  General: WD, WN. Alert, cooperative, no acute distress    EENT:  EOMI. Anicteric sclerae. MMM  Resp:  CTA bilaterally, no wheezing or rales. No accessory muscle use  CV:  Regular  rhythm,  No edema  GI:  Soft, Non distended, Non tender. +Bowel sounds  Neurologic:  Alert and oriented X 3, normal speech,   Psych:   Good insight. Not anxious nor agitated  Skin:  No rashes. No jaundice    Reviewed most current lab test results and cultures  YES  Reviewed most current radiology test results   YES  Review and summation of old records today    NO  Reviewed patient's current orders and MAR    YES  PMH/SH reviewed - no change compared to H&P  ________________________________________________________________________  Care Plan discussed with:    Comments   Patient y    Family      RN y    Care Manager     Consultant                        Multidiciplinary team rounds were held today with , nursing, pharmacist and clinical coordinator. Patient's plan of care was discussed; medications were reviewed and discharge planning was addressed.      ________________________________________________________________________  Total NON critical care TIME:  37  Minutes    Total CRITICAL CARE TIME Spent:   Minutes non procedure based      Comments   >50% of visit spent in counseling and coordination of care     ________________________________________________________________________  Adeola Hua MD     Procedures: see electronic medical records for all procedures/Xrays and details which were not copied into this note but were reviewed prior to creation of Plan. LABS:  I reviewed today's most current labs and imaging studies.   Pertinent labs include:  Recent Labs     11/16/21 0410   WBC 6.8   HGB 14.0   HCT 42.7        Recent Labs     11/16/21 0410      K 3.2*      CO2 23   *   BUN 13   CREA 0.92   CA 10.6*   ALB 4.0   TBILI 0.7   ALT 60       Signed: Jermaine Thapa MD

## 2021-11-16 NOTE — CARDIO/PULMONARY
Cardiac Rehab Note: chart review/referral     ACS, polysubstance abuse  Cardiac cath 11/16/21:  Non intervention documented, failed attempt, patient moved. Smoking history assessed. Patient is a current smoker. Smoking Cessation Program link has been added to the AVS.     EF 55-60%  on 11/14/21 per echo    Per psych consult note: Sonny Hines meets criteria for a diagnosis of  Polysubstance abuse, Unspecified mood disorder. He does not currently meet criteria for inpatient psychiatric treatment.     Recommendations:  -Pt is agreeable to a rehab program. He would likely benefit greatly from being admitted to a rehab directly from the hospital.     -Continue current medications to manage w/d sx. \"

## 2021-11-16 NOTE — PROGRESS NOTES
Bedside shift change report given to Wiliam Eid RN (oncoming nurse) by Evelin Mcclure RN (offgoing nurse). Report included the following information SBAR, Kardex and Cardiac Rhythm NSR.     1832- Received report from [place], [place] in cath lab.

## 2021-11-16 NOTE — CONSULTS
PSYCHIATRY CONSULT NOTE:    REASON FOR CONSULT: Polysubstance abuse      HISTORY OF PRESENTING COMPLAINT:  Дмитрий Hill is a 45 y.o. BLACK/ male who is currently admitted to the medical floor at [facility]. He states that he was admitted to the hospital for chest pain. He endorses using cocaine and heroin daily for \"as long as he can remember\". He states that his last use was 3 days prior to admission. He denies SI/HI/AVH currently. He denies having any outpatient psychiatric treatment. He endorses going to rehab programs previously. He denies any current psychiatric medications. He states that when he is at home, he does not sleep. He endorses AVH and paranoia when he is using drugs. He denies any family history of mental illness. PAST PSYCHIATRIC HISTORY and SUBSTANCE ABUSE HISTORY:  See above      PAST MEDICAL HISTORY:    Please see H&P for details. Past Medical History:   Diagnosis Date    Ill-defined condition     heart attack    Other ill-defined conditions(799.89)     hx of back pain     Prior to Admission medications    Medication Sig Start Date End Date Taking? Authorizing Provider   clindamycin (CLEOCIN) 300 mg capsule Take 1 Capsule by mouth two (2) times a day. 10/15/21   Jorge Hoover NP   diclofenac EC (VOLTAREN) 75 mg EC tablet Take 1 Tab by mouth two (2) times a day. 3/27/21   Jorge Hoover NP   metoprolol tartrate (LOPRESSOR) 25 mg tablet TAKE 1/2 TABLET BY MOUTH TWO TIMES A DAY 10/9/19   Georgina Altamirano MD   nitroglycerin (NITROSTAT) 0.4 mg SL tablet DISSOLVE 1 TABLET UNDER THE TONGUE AS NEEDED FOR CHEST PAIN, MAY REPEAT EVERY 5 MINUTES FOR 3 TOTAL DOSES.  10/9/19   Georgina Altamirano MD   carvedilol (COREG) 3.125 mg tablet TAKE ONE TABLET BY MOUTH EVERY 12 HOURS 10/2/19   Georgina Altamirano MD     Vitals:    11/15/21 0305 11/15/21 0732 11/15/21 1120 11/15/21 1536   BP: (!) 149/95 (!) 144/91 (!) 140/79 129/84   Pulse: 61 72 63 73   Resp: 16 18 18 20   Temp: 98.3 °F (36.8 °C) 98.2 °F (36.8 °C) 98.5 °F (36.9 °C) 98.7 °F (37.1 °C)   SpO2: 100% 97% 96% 100%   Weight:       Height:         Lab Results   Component Value Date/Time    WBC 5.9 11/13/2021 12:07 AM    Hemoglobin (POC) 13.9 03/09/2014 10:18 PM    HGB 12.1 11/13/2021 12:07 AM    Hematocrit (POC) 41 03/09/2014 10:18 PM    HCT 37.7 11/13/2021 12:07 AM    PLATELET 349 76/46/6276 12:07 AM    MCV 75.2 (L) 11/13/2021 12:07 AM     Lab Results   Component Value Date/Time    Sodium 139 11/13/2021 12:07 AM    Potassium 3.9 11/13/2021 12:07 AM    Chloride 109 (H) 11/13/2021 12:07 AM    CO2 26 11/13/2021 12:07 AM    Anion gap 4 (L) 11/13/2021 12:07 AM    Glucose 94 11/13/2021 12:07 AM    BUN 10 11/13/2021 12:07 AM    Creatinine 0.72 11/13/2021 12:07 AM    BUN/Creatinine ratio 14 11/13/2021 12:07 AM    GFR est AA >60 11/13/2021 12:07 AM    GFR est non-AA >60 11/13/2021 12:07 AM    Calcium 9.5 11/13/2021 12:07 AM    Bilirubin, total 0.5 04/30/2019 08:51 PM    Alk. phosphatase 80 04/30/2019 08:51 PM    Protein, total 7.2 04/30/2019 08:51 PM    Albumin 3.5 04/30/2019 08:51 PM    Globulin 3.7 04/30/2019 08:51 PM    A-G Ratio 0.9 (L) 04/30/2019 08:51 PM    ALT (SGPT) 24 04/30/2019 08:51 PM    AST (SGOT) 18 04/30/2019 08:51 PM     No results found for: VALF2, VALAC, VALP, VALPR, DS6, CRBAM, CRBAMP, CARB2, XCRBAM  No results found for: LITHM  RADIOLOGY REPORTS:(reviewed/updated 11/15/2021)  XR CHEST PORT    Result Date: 11/12/2021  INDICATION:  chest pain Exam: Portable chest 1538. Comparison: None. Findings: Cardiomediastinal silhouette is within normal limits. Pulmonary vasculature is not engorged. There are no focal parenchymal opacities, effusions, or pneumothorax. 1. No acute disease     ECHO ADULT COMPLETE    Result Date: 11/14/2021  · LV: Estimated LVEF is 55 - 60%. Biplane method used to measure ejection fraction. Normal cavity size, systolic function (ejection fraction normal) and diastolic function. Mild concentric hypertrophy. No results found for: Kd Xiong [id number], [id number], [id number], PREGU, POCHCG, MHCGN, HCGQR, THCGA1, SHCG, HCGN, HCGSERUM, HCGURQLPOC    PSYCHOSOCIAL HISTORY:  , lives with wife. Not working      MENTAL STATUS EXAM:    General appearance:   well groomed, psychomotor activity is WNL  Eye contact: Avoids eye contact  Speech: Spontaneous, soft, decreased output. Affect : Depressed, decreased range  Mood: \"ok\"  Thought Process: Logical, goal directed  Perception: Denies AH or VH. Thought Content: Denies SI or Plan  Insight: Partial  Judgement: Fair  Cognition: Intact grossly. ASSESSMENT AND PLAN:  Thompson Burkitt meets criteria for a diagnosis of  Polysubstance abuse, Unspecified mood disorder. He does not currently meet criteria for inpatient psychiatric treatment. Recommendations:  -Pt is agreeable to a rehab program. He would likely benefit greatly from being admitted to a rehab directly from the hospital.     -Continue current medications to manage w/d sx. Thank your your consult. Please feel free to consult us again as needed.

## 2021-11-16 NOTE — PROGRESS NOTES
11/16/2021 6:39 PM    Admit Date: 11/12/2021    Admit Diagnosis:   ACS (acute coronary syndrome) ([facility] [address] [I24.9]    Subjective:     Abundio Rodas is s/p cardiac cath with identification of a 45% distal RCA lesion. Dr. Flavio Nair attempt PCI, but patient would not be still. He aborted and will plan PCI with anesthesia.      Currently having no obvious s/s of withdrawal for narcotics    Current Facility-Administered Medications   Medication Dose Route Frequency    LORazepam (ATIVAN) injection 1 mg  1 mg IntraVENous Q4H PRN    promethazine (PHENERGAN) tablet 25 mg  25 mg Oral Q6H PRN    buPRENORphine (BUPRENEX) injection 0.3 mg  0.3 mg IntraVENous Q6H PRN    prochlorperazine (COMPAZINE) with saline injection 10 mg  10 mg IntraVENous Q6H PRN    enoxaparin (LOVENOX) injection 40 mg  40 mg SubCUTAneous Q24H    sodium chloride (NS) flush 5-40 mL  5-40 mL IntraVENous Q8H    sodium chloride (NS) flush 5-40 mL  5-40 mL IntraVENous PRN    nitroglycerin (NITROSTAT) tablet 0.4 mg  0.4 mg SubLINGual Q5MIN PRN    atorvastatin (LIPITOR) tablet 40 mg  40 mg Oral QHS    aspirin chewable tablet 81 mg  81 mg Oral DAILY    clopidogreL (PLAVIX) tablet 75 mg  75 mg Oral DAILY         Objective:      Physical Assessment:   General Appearance:  WNWD AAM in no acute distress, alert, cooperative,  appears stated age  Pulmonary:  clear to auscultation bilaterally; good effort  Cardiovascular: regular rate and rhythm; no murmur, click, rub, or gallop  Abdomen: soft, non-tender, non-distended; bowel sounds normal  Musculoskeletal: no swelling or deformity; moves all extremities  Extremities: no edema; palpable distal pulses; right radial site D/I  Skin: warm and dry  Neuro: grossly normal  Psych: normal mood and affect given the setting         Visit Vitals  /79 (BP 1 Location: Left upper arm, BP Patient Position: Lying right side)   Pulse (!) 58   Temp 97.5 °F (36.4 °C)   Resp 16   Ht 6' 3\" (1.905 m)   Wt 220 lb 7.4 oz (100 kg)   SpO2 97%   BMI 27.56 kg/m²       Data Review:   Recent Labs     11/16/21  0410   WBC 6.8   HGB 14.0   HCT 42.7        Recent Labs     11/16/21  0410      K 3.2*      CO2 23   *   BUN 13   CREA 0.92   CA 10.6*   ALB 4.0   TBILI 0.7   ALT 60       No results for input(s): TROIQ, CPK, CKMB in the last 72 hours. No intake or output data in the 24 hours ending 11/16/21 1039     Telemetry: SR   Echo noted normal LVEF and no significant valvular pathology    Assessment:     Principal Problem:    ACS (acute coronary syndrome) ([facility] [address] (11/12/2021)    Active Problems:    [facility] (11/13/2021)      Noncompliance with medication regimen (11/13/2021)      CAD (coronary artery disease), native coronary artery (11/13/2021)      S/P PTCA (percutaneous transluminal coronary angioplasty) (11/13/2021)      Mixed hyperlipidemia (11/13/2021)        Plan:     45 y.o. with multiple CV risk factors, admitted with unstable angina pectoris/mild troponin elevation in the setting of recent cocaine use:  · Continue ASA, plavix (continue for now, started by hospitalist), statin. · Hold beta-blocker at this time due to pulse in the 50s at times and recent cocaine abuse  · Cardiac cath with significant RCA lesion. Plan for PCI with anesthesia assistance  Restated to patient necessity of compliance with medications after PCI     Narcotic withdrawal per internal medicine    Mixed hyperlipidemia continue statin    Elen Cough ACNP  Cardiology     Patient seen and examined by me with the above nurse practitioner. I personally performed all components of the history, physical, and medical decision making and agree with the assessment and plan with minor modifications as noted. Today the patient had catheterization revealing 95% distal RCA stenosis, but the patient was moving vigorously and unpredictably during catheterization, which appears to have been involuntary.     General PE  Gen:  NAD  Mental Status - Alert. General Appearance - Not in acute distress. HEENT:  PERRL, no carotid bruits or JVD  Chest and Lung Exam   Inspection: Accessory muscles - No use of accessory muscles in breathing. Auscultation:   Breath sounds: - Normal.   Cardiovascular   Inspection: Jugular vein - Bilateral - Inspection Normal.   Palpation/Percussion:   Apical Impulse: - Normal.   Auscultation: Rhythm - Regular. Heart Sounds - S1 WNL and S2 WNL. No S3 or S4. Murmurs & Other Heart Sounds: Auscultation of the heart reveals - No Murmurs. Peripheral Vascular   Upper Extremity: Inspection - Bilateral - No Cyanotic nailbeds or Digital clubbing. Lower Extremity:   Palpation: Edema - Bilateral - No edema. Abdomen:   Soft, non-tender, bowel sounds are active. Neuro: A&O times 3, CN and motor grossly WNL    Need to reschedule PCI with general anesthesia. Working on timing, but it looks like it will not be before Thursday. Medical management as noted.

## 2021-11-16 NOTE — PROGRESS NOTES
Transition of Care Plan:    RUR:9% low   Disposition: home with outpatient services   Follow up appointments: PCP and cardiology   DME needed: not indicated at this time   Transportation at Discharge: spouse vs. Medicaid transport   [place] or means to access home: Pt has access         IM Medicare Letter: N/a  Is patient a BCPI-A Bundle:  N/a       If yes, was Bundle Letter given?:     Caregiver Contact: wife: Loraine Meade: [phone]  Discharge Caregiver contacted prior to discharge? Reason for Admission:  NSTEMI                      RUR Score:   9% low                   Plan for utilizing home health:   Not indicated at this time        PCP: First and Last name:  Other, MD Georgina     Name of Practice: Pt does not have a PCP. Are you a current patient: Yes/No: No    Approximate date of last visit:    Can you participate in a virtual visit with your PCP:                     Current Advanced Directive/Advance Care Plan: Full Code      Healthcare Decision Maker:   Click here to complete [address] including selection of the Healthcare Decision Maker Relationship (ie \"Primary\")                  Transition of Care Plan:       CM met with Pt at bedside to complete initial assessment. Pt is a 45year old male admitted to [facility] on 11/12/21  For NSTEMI. Pt was alert and oriented x4 during initial assessment. Pt confirmed that demographic information was correct on chart. Pt is reported to live in a private residence with his wife. Pt reports to not have a PCP at this time. Pt is receptive to CM arranging a new PCP appointment. CM discussed recommendation Per psychiatry for inpatient Rehab for substance abuse. Pt stated that he was receptive to discharging to a Substance Abuse treatment center. CM contacted [address] [place] [address] to obtain assistance for Rehab placement.  CM was informed that currently, there are very little facilities who accepts Medicaid at this time or either there is a waiting list for that payer source. CM to provide Pt with outpatient substance abuse resources, and will also contact [phone] [place] [address]. Care Management Interventions  PCP Verified by CM: No  Mode of Transport at Discharge:  Other (see comment) (spouse vs. medicaid transport)  Transition of Care Consult (CM Consult): Discharge Planning  Discharge Durable Medical Equipment: No  Physical Therapy Consult: No  Occupational Therapy Consult: No  Speech Therapy Consult: No  Support Systems: Spouse/Significant Other  Confirm Follow Up Transport: Family  Discharge Location  Discharge Placement: Home with outpatient services    Donice Hodgkin, [facility], [facility]

## 2021-11-16 NOTE — PROGRESS NOTES
Received message from patient's nurse stating:    Patient is currently being treated for heroin withdraw , has been medicated with multiple medications for his symptoms. Most recently given valium 10mg p.o. and states this has been most effective for him. Has had recent zoom meeting with psychiatry , note is in chart. Discussion / orders:    · Reviewed recent patient notes including psych consult  · Reviewed patient record  · Patient did receive Valium 10 mg p.o. x1 at approximately 1400 today  · Valium 10 mg p.o. every 6 hours as needed withdrawal symptoms ordered x 2 doses           Please note that this note was dictated using Dragon computer voice recognition software. Quite often unanticipated grammatical, syntax, homophones, and other interpretive errors are inadvertently transcribed by the computer software. Please disregard these errors. Please excuse any errors that have escaped final proofreading.

## 2021-11-16 NOTE — DISCHARGE INSTRUCTIONS
Smoking Cessation Program:   [facility] is now offering a proven, interactive, text-based smoking cessation program for FREE! To register, please text Tasia Lopez [phone] or visit [email or web address]  For more information, please call: [phone]

## 2021-11-17 LAB
ANION GAP SERPL CALC-SCNC: 7 MMOL/L (ref 5–15)
BUN SERPL-MCNC: 12 MG/DL (ref 6–20)
BUN/CREAT SERPL: 16 (ref 12–20)
CALCIUM SERPL-MCNC: 10.2 MG/DL (ref 8.5–10.1)
CHLORIDE SERPL-SCNC: 107 MMOL/L (ref 97–108)
CO2 SERPL-SCNC: 22 MMOL/L (ref 21–32)
CREAT SERPL-MCNC: 0.77 MG/DL (ref 0.7–1.3)
ERYTHROCYTE [DISTWIDTH] IN BLOOD BY AUTOMATED COUNT: 14 % (ref 11.5–14.5)
GLUCOSE SERPL-MCNC: 107 MG/DL (ref 65–100)
HCT VFR BLD AUTO: 42 % (ref 36.6–50.3)
HGB BLD-MCNC: 13.5 G/DL (ref 12.1–17)
MCH RBC QN AUTO: 23.8 PG (ref 26–34)
MCHC RBC AUTO-ENTMCNC: 32.1 G/DL (ref 30–36.5)
MCV RBC AUTO: 74.1 FL (ref 80–99)
NRBC # BLD: 0 K/UL (ref 0–0.01)
NRBC BLD-RTO: 0 PER 100 WBC
PLATELET # BLD AUTO: 224 K/UL (ref 150–400)
PMV BLD AUTO: 10.8 FL (ref 8.9–12.9)
POTASSIUM SERPL-SCNC: 3.6 MMOL/L (ref 3.5–5.1)
RBC # BLD AUTO: 5.67 M/UL (ref 4.1–5.7)
SODIUM SERPL-SCNC: 136 MMOL/L (ref 136–145)
WBC # BLD AUTO: 7.4 K/UL (ref 4.1–11.1)

## 2021-11-17 PROCEDURE — 99233 SBSQ HOSP IP/OBS HIGH 50: CPT | Performed by: INTERNAL MEDICINE

## 2021-11-17 PROCEDURE — 74011250637 HC RX REV CODE- 250/637: Performed by: INTERNAL MEDICINE

## 2021-11-17 PROCEDURE — 80048 BASIC METABOLIC PNL TOTAL CA: CPT

## 2021-11-17 PROCEDURE — 36415 COLL VENOUS BLD VENIPUNCTURE: CPT

## 2021-11-17 PROCEDURE — 74011250636 HC RX REV CODE- 250/636: Performed by: STUDENT IN AN ORGANIZED HEALTH CARE EDUCATION/TRAINING PROGRAM

## 2021-11-17 PROCEDURE — 85027 COMPLETE CBC AUTOMATED: CPT

## 2021-11-17 PROCEDURE — 65660000000 HC RM CCU STEPDOWN

## 2021-11-17 PROCEDURE — 74011250637 HC RX REV CODE- 250/637: Performed by: STUDENT IN AN ORGANIZED HEALTH CARE EDUCATION/TRAINING PROGRAM

## 2021-11-17 RX ORDER — CARVEDILOL 3.12 MG/1
3.12 TABLET ORAL 2 TIMES DAILY WITH MEALS
Status: DISCONTINUED | OUTPATIENT
Start: 2021-11-17 | End: 2021-11-19 | Stop reason: HOSPADM

## 2021-11-17 RX ADMIN — ENOXAPARIN SODIUM 40 MG: 100 INJECTION SUBCUTANEOUS at 12:00

## 2021-11-17 RX ADMIN — Medication 10 ML: at 22:42

## 2021-11-17 RX ADMIN — ATORVASTATIN CALCIUM 40 MG: 40 TABLET, FILM COATED ORAL at 22:42

## 2021-11-17 RX ADMIN — ASPIRIN 81 MG: 81 TABLET, CHEWABLE ORAL at 08:17

## 2021-11-17 RX ADMIN — CARVEDILOL 3.12 MG: 3.12 TABLET, FILM COATED ORAL at 17:42

## 2021-11-17 RX ADMIN — CLOPIDOGREL BISULFATE 75 MG: 75 TABLET ORAL at 08:17

## 2021-11-17 NOTE — PROGRESS NOTES
0700 Received report from night shift nurse. 0730 Assessed pt, obtained VS. Pt is alert and oriented and complains of no pain. Pts cath site is CDI. Pt expressed the need to leave the hospital to go see his daughter who is sick. I advised him that it's not safe for him to leave due to his RCA being occluded and them not being able to complete the procedure 11/16. Pt is now willing to stay after he talks to his doctor.

## 2021-11-17 NOTE — PROGRESS NOTES
11/17/2021 8:00AM    Admit Date: 11/12/2021    Admit Diagnosis:   ACS (acute coronary syndrome) ([facility] [address] [I24.9]    Subjective:     Neha Gamboa is s/p cardiac cath with identification of a 07% distal RCA lesion on 11/16/2021. Dr. Misael Plascencia attempt PCI, but patient would not be still. He aborted and will plan PCI with anesthesia likely on Friday, 11/19/2021    Currently having no obvious s/s of withdrawal for narcotics. No c/o CP, SOB. Patient is asking to leave for \"just an hour\" so he can see his 3 year daughter who supposedly has health issues. I explained that he can leave, but it would be AMA. Presented possibility of visiting daughter outside of front of the hospital - he agreed that would be fine. This will require an RN to escort patient for that period of time.      Current Facility-Administered Medications   Medication Dose Route Frequency    LORazepam (ATIVAN) injection 1 mg  1 mg IntraVENous Q4H PRN    promethazine (PHENERGAN) tablet 25 mg  25 mg Oral Q6H PRN    buPRENORphine (BUPRENEX) injection 0.3 mg  0.3 mg IntraVENous Q6H PRN    prochlorperazine (COMPAZINE) with saline injection 10 mg  10 mg IntraVENous Q6H PRN    enoxaparin (LOVENOX) injection 40 mg  40 mg SubCUTAneous Q24H    sodium chloride (NS) flush 5-40 mL  5-40 mL IntraVENous Q8H    sodium chloride (NS) flush 5-40 mL  5-40 mL IntraVENous PRN    nitroglycerin (NITROSTAT) tablet 0.4 mg  0.4 mg SubLINGual Q5MIN PRN    atorvastatin (LIPITOR) tablet 40 mg  40 mg Oral QHS    aspirin chewable tablet 81 mg  81 mg Oral DAILY    clopidogreL (PLAVIX) tablet 75 mg  75 mg Oral DAILY         Objective:      Physical Assessment:   General Appearance:  WNWD AAM in no acute distress, alert, cooperative,  appears stated age  Pulmonary:  clear to auscultation bilaterally; good effort  Cardiovascular: regular rate and rhythm; no murmur, click, rub, or gallop  Abdomen: soft, non-tender, non-distended; bowel sounds normal  Musculoskeletal: no swelling or deformity; moves all extremities  Extremities: no edema; palpable distal pulses; right radial site D/I  Skin: warm and dry  Neuro: grossly normal  Psych: normal mood and affect given the setting         Visit Vitals  /61 (BP 1 Location: Right upper arm, BP Patient Position: At rest)   Pulse 89   Temp 97.9 °F (36.6 °C)   Resp 16   Ht 6' 3\" (1.905 m)   Wt 220 lb 7.4 oz (100 kg)   SpO2 98%   BMI 27.56 kg/m²       Data Review:   Recent Labs     11/17/21 0125 11/16/21 0410   WBC 7.4 6.8   HGB 13.5 14.0   HCT 42.0 42.7    239     Recent Labs     11/17/21 0125 11/16/21 0410    139   K 3.6 3.2*    108   CO2 22 23   * 107*   BUN 12 13   CREA 0.77 0.92   CA 10.2* 10.6*   ALB  --  4.0   TBILI  --  0.7   ALT  --  60       No results for input(s): TROIQ, CPK, CKMB in the last 72 hours. Intake/Output Summary (Last 24 hours) at 11/17/2021 0936  Last data filed at 11/16/2021 1916  Gross per 24 hour   Intake 120 ml   Output --   Net 120 ml        Telemetry: SR   Echo noted normal LVEF and no significant valvular pathology    Assessment:     Principal Problem:    ACS (acute coronary syndrome) ([facility] (11/12/2021)    Active Problems:    Pol[facility]ub[facility] ([facility] (11/13/2021)      Noncompliance with medication regimen (11/13/2021)      CAD (coronary artery disease), native coronary artery (11/13/2021)      S/P PTCA (percutaneous transluminal coronary angioplasty) (11/13/2021)      Mixed hyperlipidemia (11/13/2021)        Plan:     45 y.o. with multiple CV risk factors, admitted with unstable angina pectoris/mild troponin elevation in the setting of recent cocaine use:  · Continue ASA, plavix (continue for now, started by hospitalist), statin. · Start beta-blocker - HR improved and BP will tolerated. He is post 6 days post last cocaine use  · Cardiac cath with significant RCA lesion.   Plan for PCI with anesthesia assistance likely on Friday    Restated to patient necessity of compliance with medications after PCI     Narcotic withdrawal per internal medicine    Mixed hyperlipidemia continue statin    Hollie Vizcarra ACNP  Cardiology     Patient seen and examined by me with the above nurse practitioner. I personally performed all components of the history, physical, and medical decision making and agree with the assessment and plan with minor modifications as noted.      On 11/16/2021 the patient had catheterization revealing 95% distal RCA stenosis, but the patient was moving vigorously and unpredictably during catheterization, which appears to have been involuntary.     General PE  Gen:  NAD  Mental Status - Alert. General Appearance - Not in acute distress. HEENT:  PERRL, no carotid bruits or JVD  Chest and Lung Exam   Inspection: Accessory muscles - No use of accessory muscles in breathing. Auscultation:   Breath sounds: - Normal.   Cardiovascular   Inspection: Jugular vein - Bilateral - Inspection Normal.   Palpation/Percussion:   Apical Impulse: - Normal.   Auscultation: Rhythm - Regular. Heart Sounds - S1 WNL and S2 WNL. No S3 or S4. Murmurs & Other Heart Sounds: Auscultation of the heart reveals - No Murmurs. Peripheral Vascular   Upper Extremity: Inspection - Bilateral - No Cyanotic nailbeds or Digital clubbing. Lower Extremity:   Palpation: Edema - Bilateral - No edema. Abdomen:   Soft, non-tender, bowel sounds are active. Neuro: A&O times 3, CN and motor grossly WNL     Need to reschedule PCI with general anesthesia. Working on timing, but it looks like it will have to be 10 AM on Friday. Medical management as noted.

## 2021-11-17 NOTE — PROGRESS NOTES
Spiritual Care Assessment/Progress Note  [facility]      NAME: Saman Spear      MRN: [id number]  AGE: 45 y.o.  SEX: male  Mandaen Affiliation: Mosque   Language: English     11/17/2021     Total Time (in minutes): 9     Spiritual Assessment begun in [facility] through conversation with:         [x]Patient        [] Family    [] Friend(s)        Reason for Consult: Initial/Spiritual assessment, patient floor     Spiritual beliefs: (Please include comment if needed)     [] Identifies with a eben tradition:         [] Supported by a eben community:            [] Claims no spiritual orientation:           [] Seeking spiritual identity:                [] Adheres to an individual form of spirituality:           [x] Not able to assess:                           Identified resources for coping:      [] Prayer                               [] Music                  [] Guided Imagery     [] Family/friends                 [] Pet visits     [] Devotional reading                         [x] Unknown     [] Other:                                               Interventions offered during this visit: (See comments for more details)    Patient Interventions: Affirmation of emotions/emotional suffering, Catharsis/review of pertinent events in supportive environment, Coping skills reviewed/reinforced           Plan of Care:     [] Support spiritual and/or cultural needs    [] Support AMD and/or advance care planning process      [] Support grieving process   [] Coordinate Rites and/or Rituals    [] Coordination with community clergy   [] No spiritual needs identified at this time   [] Detailed Plan of Care below (See Comments)  [] Make referral to Music Therapy  [] Make referral to Pet Therapy     [] Make referral to Addiction services  [] Make referral to [facility]  [] Make referral to Spiritual Care Partner  [] No future visits requested        [x] Contact Spiritual Care for further referrals     Comments:     Initial Spiritual Care Assessment visit for Mr. Zaid Powell in 2162. Reviewed the chart before visit. Patient was alert, no family was present during the visit. Patient was talking on the phone,  asked if it is a good time for 's visit. Pt's said he is doing okay,  gave him time and space for phone call. Advised of  availability.       [id number] [facility] [address],[place], [address] Provider   Paging Service [phone])

## 2021-11-17 NOTE — PROGRESS NOTES
Hospitalist Progress Note    NAME: Nahid Rodriguez   :  1970   MRN:  [id number]       Assessment / Plan:  NSTEMI    C/w Aspirin, high intensity statin, Plavix  Coreg resumed as per Cardiology recommendation, has been 6 days from his cocaine use  HS Tropnin: elevated. LDL: 113  Cardiac cath on  showed 95% RCA stenosis, couldn't go PCI as he wouldn't stay still, will do on Friday with anesthesia assistance     Substance abuse  Heroin withdrawls  Positive for cocaine which he states he used 3 days PTA  Also admits to heroin which he used 2 days PTA  Withdrawal better today, c/w PRN Compazine, valium, buprenorphine  Psych evaluated recommended Detox admission, however as per , his insurance wouldn't cover for inpatient detox, will have to follow up as outpatient     Code Status: Full  Surrogate Decision Maker: Wife     DVT Prophylaxis: Lovenox  GI Prophylaxis: not indicated     Baseline: Independent                Estimated discharge date:   Barriers: PCI on      Subjective:     Chief Complaint / Reason for Physician Visit  He doesn't have any chest pain this AM  His withdrawal symptoms are better today  He is motivated that he doesn't want to go back to drugs      Review of Systems:  Symptom Y/N Comments  Symptom Y/N Comments   Fever/Chills n   Chest Pain n    Poor Appetite n   Edema n    Cough    Abdominal Pain     Sputum    Joint Pain     SOB/LAMA    Pruritis/Rash     Nausea/vomit    Tolerating PT/OT     Diarrhea    Tolerating Diet     Constipation    Other       Could NOT obtain due to:      Objective:     VITALS:   Last 24hrs VS reviewed since prior progress note.  Most recent are:  Patient Vitals for the past 24 hrs:   Temp Pulse Resp BP SpO2   21 1150 98.6 °F (37 °C) 95 18 128/76 99 %   21 0700 97.9 °F (36.6 °C) 89 16 108/61 98 %   21 0624 98.2 °F (36.8 °C) 77 18 138/83 97 %   21 0118 98.6 °F (37 °C) 75 18 121/69 98 %   21 1916 98.9 °F (37.2 °C) 77 18 126/74 97 %   11/16/21 1600 98.1 °F (36.7 °C) 76 16 135/81 98 %       Intake/Output Summary (Last 24 hours) at 11/17/2021 1552  Last data filed at 11/16/2021 1916  Gross per 24 hour   Intake 120 ml   Output --   Net 120 ml        I had a face to face encounter and independently examined this patient on 11/17/2021, as outlined below:  PHYSICAL EXAM:  General: WD, WN. Alert, cooperative, no acute distress    EENT:  EOMI. Anicteric sclerae. MMM  Resp:  CTA bilaterally, no wheezing or rales. No accessory muscle use  CV:  Regular  rhythm,  No edema  GI:  Soft, Non distended, Non tender. +Bowel sounds  Neurologic:  Alert and oriented X 3, normal speech,   Psych:   Good insight. Not anxious nor agitated  Skin:  No rashes. No jaundice    Reviewed most current lab test results and cultures  YES  Reviewed most current radiology test results   YES  Review and summation of old records today    NO  Reviewed patient's current orders and MAR    YES  PMH/ reviewed - no change compared to H&P  ________________________________________________________________________  Care Plan discussed with:    Comments   Patient y    Family      RN y    Care Manager     Consultant                        Multidiciplinary team rounds were held today with , nursing, pharmacist and clinical coordinator. Patient's plan of care was discussed; medications were reviewed and discharge planning was addressed. ________________________________________________________________________  Total NON critical care TIME:  37  Minutes    Total CRITICAL CARE TIME Spent:   Minutes non procedure based      Comments   >50% of visit spent in counseling and coordination of care     ________________________________________________________________________  Dasha Dominguez MD     Procedures: see electronic medical records for all procedures/Xrays and details which were not copied into this note but were reviewed prior to creation of Plan.       LABS:  I reviewed today's most current labs and imaging studies.   Pertinent labs include:  Recent Labs     11/17/21  0125 11/16/21  0410   WBC 7.4 6.8   HGB 13.5 14.0   HCT 42.0 42.7    239     Recent Labs     11/17/21 0125 11/16/21 0410    139   K 3.6 3.2*    108   CO2 22 23   * 107*   BUN 12 13   CREA 0.77 0.92   CA 10.2* 10.6*   ALB  --  4.0   TBILI  --  0.7   ALT  --  60       Signed: Rachel Gunter MD

## 2021-11-18 PROCEDURE — 74011250637 HC RX REV CODE- 250/637: Performed by: INTERNAL MEDICINE

## 2021-11-18 PROCEDURE — 99233 SBSQ HOSP IP/OBS HIGH 50: CPT | Performed by: INTERNAL MEDICINE

## 2021-11-18 PROCEDURE — 74011250636 HC RX REV CODE- 250/636: Performed by: INTERNAL MEDICINE

## 2021-11-18 PROCEDURE — 74011250637 HC RX REV CODE- 250/637: Performed by: STUDENT IN AN ORGANIZED HEALTH CARE EDUCATION/TRAINING PROGRAM

## 2021-11-18 PROCEDURE — 65660000000 HC RM CCU STEPDOWN

## 2021-11-18 RX ORDER — ENOXAPARIN SODIUM 100 MG/ML
1 INJECTION SUBCUTANEOUS EVERY 12 HOURS
Status: COMPLETED | OUTPATIENT
Start: 2021-11-18 | End: 2021-11-18

## 2021-11-18 RX ORDER — CLOPIDOGREL BISULFATE 75 MG/1
300 TABLET ORAL
Status: COMPLETED | OUTPATIENT
Start: 2021-11-18 | End: 2021-11-18

## 2021-11-18 RX ORDER — CLOPIDOGREL BISULFATE 75 MG/1
75 TABLET ORAL DAILY
Status: DISCONTINUED | OUTPATIENT
Start: 2021-11-19 | End: 2021-11-19 | Stop reason: HOSPADM

## 2021-11-18 RX ADMIN — ASPIRIN 81 MG: 81 TABLET, CHEWABLE ORAL at 09:52

## 2021-11-18 RX ADMIN — ENOXAPARIN SODIUM 100 MG: 100 INJECTION SUBCUTANEOUS at 09:52

## 2021-11-18 RX ADMIN — ATORVASTATIN CALCIUM 40 MG: 40 TABLET, FILM COATED ORAL at 21:11

## 2021-11-18 RX ADMIN — CARVEDILOL 3.12 MG: 3.12 TABLET, FILM COATED ORAL at 09:52

## 2021-11-18 RX ADMIN — ENOXAPARIN SODIUM 100 MG: 100 INJECTION SUBCUTANEOUS at 21:11

## 2021-11-18 RX ADMIN — CLOPIDOGREL BISULFATE 300 MG: 75 TABLET ORAL at 09:52

## 2021-11-18 RX ADMIN — CARVEDILOL 3.12 MG: 3.12 TABLET, FILM COATED ORAL at 18:00

## 2021-11-18 RX ADMIN — Medication 10 ML: at 21:11

## 2021-11-18 NOTE — PROGRESS NOTES
11/18/2021 8:00AM    Admit Date: 11/12/2021    Admit Diagnosis:   ACS (acute coronary syndrome) ([facility] [address] [I24.9]    Subjective:     Yaya Taylor is s/p cardiac cath with identification of a 85% distal RCA lesion on 11/16/2021. Dr. Natalya Narayanan attempt PCI, but patient would not be still. He aborted and will plan PCI with anesthesia likely on Friday, 11/19/2021    Currently having no obvious s/s of withdrawal for narcotics. No c/o CP, SOB.        Current Facility-Administered Medications   Medication Dose Route Frequency    enoxaparin (LOVENOX) injection 100 mg  1 mg/kg SubCUTAneous Q12H    [START ON 11/19/2021] clopidogreL (PLAVIX) tablet 75 mg  75 mg Oral DAILY    carvediloL (COREG) tablet 3.125 mg  3.125 mg Oral BID WITH MEALS    LORazepam (ATIVAN) injection 1 mg  1 mg IntraVENous Q4H PRN    promethazine (PHENERGAN) tablet 25 mg  25 mg Oral Q6H PRN    buPRENORphine (BUPRENEX) injection 0.3 mg  0.3 mg IntraVENous Q6H PRN    prochlorperazine (COMPAZINE) with saline injection 10 mg  10 mg IntraVENous Q6H PRN    sodium chloride (NS) flush 5-40 mL  5-40 mL IntraVENous Q8H    sodium chloride (NS) flush 5-40 mL  5-40 mL IntraVENous PRN    nitroglycerin (NITROSTAT) tablet 0.4 mg  0.4 mg SubLINGual Q5MIN PRN    atorvastatin (LIPITOR) tablet 40 mg  40 mg Oral QHS    aspirin chewable tablet 81 mg  81 mg Oral DAILY         Objective:      Physical Assessment:   General Appearance:  WNWD AAM in no acute distress, alert, cooperative,  appears stated age  Pulmonary:  clear to auscultation bilaterally; good effort  Cardiovascular: regular rate and rhythm; no murmur, click, rub, or gallop  Abdomen: soft, non-tender, non-distended; bowel sounds normal  Musculoskeletal: no swelling or deformity; moves all extremities  Extremities: no edema; palpable distal pulses; right radial site D/I  Skin: warm and dry  Neuro: grossly normal  Psych: normal mood and affect given the setting         Visit Vitals  BP (!) 101/59 (BP 1 Location: Right upper arm, BP Patient Position: Lying; At rest)   Pulse 77   Temp 98.3 °F (36.8 °C)   Resp 16   Ht 6' 3\" (1.905 m)   Wt 220 lb 7.4 oz (100 kg)   SpO2 98%   BMI 27.56 kg/m²       Data Review:   Recent Labs     11/17/21 0125 11/16/21  0410   WBC 7.4 6.8   HGB 13.5 14.0   HCT 42.0 42.7    239     Recent Labs     11/17/21  0125 11/16/21  0410    139   K 3.6 3.2*    108   CO2 22 23   * 107*   BUN 12 13   CREA 0.77 0.92   CA 10.2* 10.6*   ALB  --  4.0   TBILI  --  0.7   ALT  --  60       No results for input(s): TROIQ, CPK, CKMB in the last 72 hours. No intake or output data in the 24 hours ending 11/18/21 1505     Telemetry: SR   Echo noted normal LVEF and no significant valvular pathology    Assessment:     Principal Problem:    ACS (acute coronary syndrome) ([facility] [address] (11/12/2021)    Active Problems:    Polysubstance abuse ([facility] [address] (11/13/2021)      Noncompliance with medication regimen (11/13/2021)      CAD (coronary artery disease), native coronary artery (11/13/2021)      S/P PTCA (percutaneous transluminal coronary angioplasty) (11/13/2021)      Mixed hyperlipidemia (11/13/2021)        Plan:     45 y.o. with multiple CV risk factors, admitted with unstable angina pectoris/mild troponin elevation in the setting of recent cocaine use:  · Continue ASA, plavix (continue for now, started by hospitalist), statin. · Plavix loading and Lovenox x2 doses prior to PCI  · Continue beta-blocker - HR improved and BP will tolerated. He is post 6 days post last cocaine use  · Cardiac cath with significant RCA lesion.   Plan for PCI with anesthesia assistance on Friday    Restated to patient necessity of compliance with medications after PCI     Narcotic withdrawal per internal medicine    Mixed hyperlipidemia continue statin

## 2021-11-18 NOTE — PROGRESS NOTES
Problem: Falls - Risk of  Goal: *Absence of Falls  Description: Document Rodrigo Led Fall Risk and appropriate interventions in the flowsheet.   11/18/2021 0330 by Carol Esparza RN  Outcome: Progressing Towards Goal  Note: Fall Risk Interventions:       Mentation Interventions: Adequate sleep, hydration, pain control, Bed/chair exit alarm, Family/sitter at bedside    Medication Interventions: Bed/chair exit alarm, Patient to call before getting OOB, Teach patient to arise slowly                11/17/2021 2002 by Carol Esparza RN  Outcome: Progressing Towards Goal  Note: Fall Risk Interventions:       Mentation Interventions: Adequate sleep, hydration, pain control, Bed/chair exit alarm, Family/sitter at bedside    Medication Interventions: Bed/chair exit alarm, Patient to call before getting OOB, Teach patient to arise slowly

## 2021-11-18 NOTE — PROGRESS NOTES
Hospitalist Progress Note    NAME: Saman Spear   :  1970   MRN:  [id number]       Assessment / Plan:  NSTEMI    C/w Aspirin, high intensity statin, Plavix  Coreg resumed as per Cardiology recommendation, has been 6 days from his cocaine use  HS Tropnin: elevated. LDL: 113  Cardiac cath on  showed 95% RCA stenosis, couldn't go PCI as he wouldn't stay still, will do on Friday with anesthesia assistance     Substance abuse  Heroin withdrawls  Positive for cocaine which he states he used 3 days PTA  Also admits to heroin which he used 2 days PTA  Withdrawal better today, c/w PRN Compazine, valium, buprenorphine  Psych evaluated recommended Detox admission, however as per , his insurance wouldn't cover for inpatient detox, will have to follow up as outpatient     Code Status: Full  Surrogate Decision Maker: Wife     DVT Prophylaxis: Lovenox  GI Prophylaxis: not indicated     Baseline: Independent                Estimated discharge date:   Barriers: PCI on      Subjective:     Chief Complaint / Reason for Physician Visit  He doesn't have any chest pain this AM  He is doing much better, no withdrawls anymore. Review of Systems:  Symptom Y/N Comments  Symptom Y/N Comments   Fever/Chills n   Chest Pain n    Poor Appetite n   Edema n    Cough    Abdominal Pain     Sputum    Joint Pain     SOB/LAMA    Pruritis/Rash     Nausea/vomit    Tolerating PT/OT     Diarrhea    Tolerating Diet     Constipation    Other       Could NOT obtain due to:      Objective:     VITALS:   Last 24hrs VS reviewed since prior progress note.  Most recent are:  Patient Vitals for the past 24 hrs:   Temp Pulse Resp BP SpO2   21 1135 98.1 °F (36.7 °C) 70 16 115/74 98 %   21 0850 98 °F (36.7 °C) 76 16 116/81 97 %   21 0300 98.2 °F (36.8 °C) 79 16 104/60 97 %   21 2245 98.9 °F (37.2 °C) 88 15 114/70 96 %   21 1932 98.2 °F (36.8 °C) 86 16 113/81 100 %   21 1528 98.2 °F (36.8 °C) 84 16 116/63 98 %     No intake or output data in the 24 hours ending 11/18/21 1339     I had a face to face encounter and independently examined this patient on 11/18/2021, as outlined below:  PHYSICAL EXAM:  General: WD, WN. Alert, cooperative, no acute distress    EENT:  EOMI. Anicteric sclerae. MMM  Resp:  CTA bilaterally, no wheezing or rales. No accessory muscle use  CV:  Regular  rhythm,  No edema  GI:  Soft, Non distended, Non tender. +Bowel sounds  Neurologic:  Alert and oriented X 3, normal speech,   Psych:   Good insight. Not anxious nor agitated  Skin:  No rashes. No jaundice    Reviewed most current lab test results and cultures  YES  Reviewed most current radiology test results   YES  Review and summation of old records today    NO  Reviewed patient's current orders and MAR    YES  PMH/ reviewed - no change compared to H&P  ________________________________________________________________________  Care Plan discussed with:    Comments   Patient y    Family      RN y    Care Manager     Consultant                        Multidiciplinary team rounds were held today with , nursing, pharmacist and clinical coordinator. Patient's plan of care was discussed; medications were reviewed and discharge planning was addressed. ________________________________________________________________________  Total NON critical care TIME:  37  Minutes    Total CRITICAL CARE TIME Spent:   Minutes non procedure based      Comments   >50% of visit spent in counseling and coordination of care     ________________________________________________________________________  Sabrina Webster MD     Procedures: see electronic medical records for all procedures/Xrays and details which were not copied into this note but were reviewed prior to creation of Plan. LABS:  I reviewed today's most current labs and imaging studies.   Pertinent labs include:  Recent Labs     11/17/21  0125 11/16/21  0410   WBC 7.4 6.8   HGB 13.5 14.0   HCT 42.0 42.7    239     Recent Labs     11/17/21  0125 11/16/21  0410    139   K 3.6 3.2*    108   CO2 22 23   * 107*   BUN 12 13   CREA 0.77 0.92   CA 10.2* 10.6*   ALB  --  4.0   TBILI  --  0.7   ALT  --  60       Signed: Waverly Canavan, MD

## 2021-11-18 NOTE — PROGRESS NOTES
Problem: Falls - Risk of  Goal: *Absence of Falls  Description: Document Sohan Cea Fall Risk and appropriate interventions in the flowsheet.   Outcome: Progressing Towards Goal  Note: Fall Risk Interventions:       Mentation Interventions: Adequate sleep, hydration, pain control, Bed/chair exit alarm, Family/sitter at bedside    Medication Interventions: Bed/chair exit alarm, Patient to call before getting OOB, Teach patient to arise slowly

## 2021-11-19 ENCOUNTER — TRANSCRIBE ORDER (OUTPATIENT)
Dept: CARDIAC REHAB | Age: 51
End: 2021-11-19

## 2021-11-19 ENCOUNTER — ANESTHESIA (OUTPATIENT)
Dept: CARDIAC CATH/INVASIVE PROCEDURES | Age: 51
End: 2021-11-19
Payer: COMMERCIAL

## 2021-11-19 ENCOUNTER — ANESTHESIA EVENT (OUTPATIENT)
Dept: CARDIAC CATH/INVASIVE PROCEDURES | Age: 51
End: 2021-11-19
Payer: COMMERCIAL

## 2021-11-19 VITALS
DIASTOLIC BLOOD PRESSURE: 68 MMHG | OXYGEN SATURATION: 98 % | BODY MASS INDEX: 27.41 KG/M2 | HEART RATE: 68 BPM | SYSTOLIC BLOOD PRESSURE: 126 MMHG | HEIGHT: 75 IN | TEMPERATURE: 98.3 F | WEIGHT: 220.46 LBS | RESPIRATION RATE: 19 BRPM

## 2021-11-19 DIAGNOSIS — Z95.5 STENTED CORONARY ARTERY: Primary | ICD-10-CM

## 2021-11-19 PROBLEM — Z98.890 S/P CARDIAC CATH: Status: ACTIVE | Noted: 2021-11-19

## 2021-11-19 LAB
ATRIAL RATE: 68 BPM
CALCULATED P AXIS, ECG09: 63 DEGREES
CALCULATED R AXIS, ECG10: 23 DEGREES
CALCULATED T AXIS, ECG11: 43 DEGREES
DIAGNOSIS, 93000: NORMAL
P-R INTERVAL, ECG05: 150 MS
Q-T INTERVAL, ECG07: 354 MS
QRS DURATION, ECG06: 84 MS
QTC CALCULATION (BEZET), ECG08: 376 MS
VENTRICULAR RATE, ECG03: 68 BPM

## 2021-11-19 PROCEDURE — 77030004549 HC CATH ANGI DX PRF MRTM -A: Performed by: INTERNAL MEDICINE

## 2021-11-19 PROCEDURE — 92928 PRQ TCAT PLMT NTRAC ST 1 LES: CPT | Performed by: INTERNAL MEDICINE

## 2021-11-19 PROCEDURE — C1725 CATH, TRANSLUMIN NON-LASER: HCPCS | Performed by: INTERNAL MEDICINE

## 2021-11-19 PROCEDURE — 77030019697 HC SYR ANGI INFL MRTM -B: Performed by: INTERNAL MEDICINE

## 2021-11-19 PROCEDURE — C1769 GUIDE WIRE: HCPCS | Performed by: INTERNAL MEDICINE

## 2021-11-19 PROCEDURE — 4A023N7 MEASUREMENT OF CARDIAC SAMPLING AND PRESSURE, LEFT HEART, PERCUTANEOUS APPROACH: ICD-10-PCS | Performed by: INTERNAL MEDICINE

## 2021-11-19 PROCEDURE — C1887 CATHETER, GUIDING: HCPCS | Performed by: INTERNAL MEDICINE

## 2021-11-19 PROCEDURE — 77030010221 HC SPLNT WR POS TELE -B: Performed by: INTERNAL MEDICINE

## 2021-11-19 PROCEDURE — 77030042317 HC BND COMPR HEMSTAT -B: Performed by: INTERNAL MEDICINE

## 2021-11-19 PROCEDURE — 74011000250 HC RX REV CODE- 250: Performed by: INTERNAL MEDICINE

## 2021-11-19 PROCEDURE — 74011250637 HC RX REV CODE- 250/637: Performed by: STUDENT IN AN ORGANIZED HEALTH CARE EDUCATION/TRAINING PROGRAM

## 2021-11-19 PROCEDURE — 77030011992 HC AIRWY NASOPHGL TELE -A: Performed by: STUDENT IN AN ORGANIZED HEALTH CARE EDUCATION/TRAINING PROGRAM

## 2021-11-19 PROCEDURE — 74011000636 HC RX REV CODE- 636: Performed by: INTERNAL MEDICINE

## 2021-11-19 PROCEDURE — 74011250637 HC RX REV CODE- 250/637: Performed by: INTERNAL MEDICINE

## 2021-11-19 PROCEDURE — 2709999900 HC NON-CHARGEABLE SUPPLY

## 2021-11-19 PROCEDURE — 74011250636 HC RX REV CODE- 250/636: Performed by: INTERNAL MEDICINE

## 2021-11-19 PROCEDURE — 74011000250 HC RX REV CODE- 250: Performed by: NURSE ANESTHETIST, CERTIFIED REGISTERED

## 2021-11-19 PROCEDURE — C1894 INTRO/SHEATH, NON-LASER: HCPCS | Performed by: INTERNAL MEDICINE

## 2021-11-19 PROCEDURE — 74011250636 HC RX REV CODE- 250/636: Performed by: NURSE ANESTHETIST, CERTIFIED REGISTERED

## 2021-11-19 PROCEDURE — C1874 STENT, COATED/COV W/DEL SYS: HCPCS | Performed by: INTERNAL MEDICINE

## 2021-11-19 PROCEDURE — 74011000258 HC RX REV CODE- 258: Performed by: INTERNAL MEDICINE

## 2021-11-19 PROCEDURE — 77030008543 HC TBNG MON PRSS MRTM -A: Performed by: INTERNAL MEDICINE

## 2021-11-19 PROCEDURE — 85347 COAGULATION TIME ACTIVATED: CPT

## 2021-11-19 PROCEDURE — 76937 US GUIDE VASCULAR ACCESS: CPT | Performed by: INTERNAL MEDICINE

## 2021-11-19 PROCEDURE — 93458 L HRT ARTERY/VENTRICLE ANGIO: CPT | Performed by: INTERNAL MEDICINE

## 2021-11-19 PROCEDURE — 93005 ELECTROCARDIOGRAM TRACING: CPT

## 2021-11-19 PROCEDURE — 027035Z DILATION OF CORONARY ARTERY, ONE ARTERY WITH TWO DRUG-ELUTING INTRALUMINAL DEVICES, PERCUTANEOUS APPROACH: ICD-10-PCS | Performed by: INTERNAL MEDICINE

## 2021-11-19 PROCEDURE — 99233 SBSQ HOSP IP/OBS HIGH 50: CPT | Performed by: INTERNAL MEDICINE

## 2021-11-19 PROCEDURE — APPSS45 APP SPLIT SHARED TIME 31-45 MINUTES: Performed by: NURSE PRACTITIONER

## 2021-11-19 PROCEDURE — 76060000033 HC ANESTHESIA 1 TO 1.5 HR: Performed by: INTERNAL MEDICINE

## 2021-11-19 DEVICE — STENT RONYX27526UX RESOLUTE ONYX 2.75X26
Type: IMPLANTABLE DEVICE | Status: FUNCTIONAL
Brand: RESOLUTE ONYX™

## 2021-11-19 DEVICE — STENT RONYX22538UX RESOLUTE ONYX 2.25X38
Type: IMPLANTABLE DEVICE | Status: FUNCTIONAL
Brand: RESOLUTE ONYX™

## 2021-11-19 RX ORDER — PROPOFOL 10 MG/ML
INJECTION, EMULSION INTRAVENOUS
Status: DISCONTINUED | OUTPATIENT
Start: 2021-11-19 | End: 2021-11-19 | Stop reason: HOSPADM

## 2021-11-19 RX ORDER — ONDANSETRON 2 MG/ML
INJECTION INTRAMUSCULAR; INTRAVENOUS AS NEEDED
Status: DISCONTINUED | OUTPATIENT
Start: 2021-11-19 | End: 2021-11-19 | Stop reason: HOSPADM

## 2021-11-19 RX ORDER — LIDOCAINE HYDROCHLORIDE 10 MG/ML
INJECTION, SOLUTION EPIDURAL; INFILTRATION; INTRACAUDAL; PERINEURAL AS NEEDED
Status: DISCONTINUED | OUTPATIENT
Start: 2021-11-19 | End: 2021-11-19

## 2021-11-19 RX ORDER — FENTANYL CITRATE 50 UG/ML
INJECTION, SOLUTION INTRAMUSCULAR; INTRAVENOUS AS NEEDED
Status: DISCONTINUED | OUTPATIENT
Start: 2021-11-19 | End: 2021-11-19 | Stop reason: HOSPADM

## 2021-11-19 RX ORDER — CLOPIDOGREL BISULFATE 75 MG/1
75 TABLET ORAL DAILY
Qty: 30 TABLET | Refills: 11 | Status: SHIPPED | OUTPATIENT
Start: 2021-11-20

## 2021-11-19 RX ORDER — SODIUM CHLORIDE 9 MG/ML
75 INJECTION, SOLUTION INTRAVENOUS CONTINUOUS
Status: DISCONTINUED | OUTPATIENT
Start: 2021-11-19 | End: 2021-11-19 | Stop reason: HOSPADM

## 2021-11-19 RX ORDER — HEPARIN SODIUM 1000 [USP'U]/ML
INJECTION, SOLUTION INTRAVENOUS; SUBCUTANEOUS AS NEEDED
Status: DISCONTINUED | OUTPATIENT
Start: 2021-11-19 | End: 2021-11-19

## 2021-11-19 RX ORDER — VERAPAMIL HYDROCHLORIDE 2.5 MG/ML
INJECTION, SOLUTION INTRAVENOUS AS NEEDED
Status: DISCONTINUED | OUTPATIENT
Start: 2021-11-19 | End: 2021-11-19

## 2021-11-19 RX ORDER — HEPARIN SODIUM 200 [USP'U]/100ML
INJECTION, SOLUTION INTRAVENOUS
Status: DISCONTINUED | OUTPATIENT
Start: 2021-11-19 | End: 2021-11-19

## 2021-11-19 RX ORDER — CLOPIDOGREL BISULFATE 75 MG/1
TABLET ORAL AS NEEDED
Status: DISCONTINUED | OUTPATIENT
Start: 2021-11-19 | End: 2021-11-19

## 2021-11-19 RX ORDER — PROPOFOL 10 MG/ML
INJECTION, EMULSION INTRAVENOUS AS NEEDED
Status: DISCONTINUED | OUTPATIENT
Start: 2021-11-19 | End: 2021-11-19 | Stop reason: HOSPADM

## 2021-11-19 RX ORDER — GLYCOPYRROLATE 0.2 MG/ML
INJECTION INTRAMUSCULAR; INTRAVENOUS AS NEEDED
Status: DISCONTINUED | OUTPATIENT
Start: 2021-11-19 | End: 2021-11-19 | Stop reason: HOSPADM

## 2021-11-19 RX ORDER — CARVEDILOL 3.12 MG/1
3.12 TABLET ORAL 2 TIMES DAILY WITH MEALS
Qty: 60 TABLET | Refills: 11 | Status: SHIPPED | OUTPATIENT
Start: 2021-11-19

## 2021-11-19 RX ORDER — DEXMEDETOMIDINE HYDROCHLORIDE 100 UG/ML
INJECTION, SOLUTION INTRAVENOUS AS NEEDED
Status: DISCONTINUED | OUTPATIENT
Start: 2021-11-19 | End: 2021-11-19 | Stop reason: HOSPADM

## 2021-11-19 RX ORDER — OXYCODONE HYDROCHLORIDE 5 MG/1
5 TABLET ORAL AS NEEDED
Status: CANCELLED | OUTPATIENT
Start: 2021-11-19

## 2021-11-19 RX ORDER — CLOPIDOGREL BISULFATE 75 MG/1
225 TABLET ORAL ONCE
Status: COMPLETED | OUTPATIENT
Start: 2021-11-19 | End: 2021-11-19

## 2021-11-19 RX ORDER — SODIUM CHLORIDE 0.9 % (FLUSH) 0.9 %
5-40 SYRINGE (ML) INJECTION EVERY 8 HOURS
Status: CANCELLED | OUTPATIENT
Start: 2021-11-19

## 2021-11-19 RX ORDER — FENTANYL CITRATE 50 UG/ML
25 INJECTION, SOLUTION INTRAMUSCULAR; INTRAVENOUS
Status: CANCELLED | OUTPATIENT
Start: 2021-11-19

## 2021-11-19 RX ORDER — MIDAZOLAM HYDROCHLORIDE 1 MG/ML
INJECTION, SOLUTION INTRAMUSCULAR; INTRAVENOUS AS NEEDED
Status: DISCONTINUED | OUTPATIENT
Start: 2021-11-19 | End: 2021-11-19 | Stop reason: HOSPADM

## 2021-11-19 RX ORDER — GUAIFENESIN 100 MG/5ML
81 LIQUID (ML) ORAL DAILY
Qty: 30 TABLET | Refills: 11 | Status: SHIPPED | OUTPATIENT
Start: 2021-11-20

## 2021-11-19 RX ORDER — SODIUM CHLORIDE, SODIUM LACTATE, POTASSIUM CHLORIDE, CALCIUM CHLORIDE 600; 310; 30; 20 MG/100ML; MG/100ML; MG/100ML; MG/100ML
INJECTION, SOLUTION INTRAVENOUS
Status: DISCONTINUED | OUTPATIENT
Start: 2021-11-19 | End: 2021-11-19 | Stop reason: HOSPADM

## 2021-11-19 RX ORDER — ATORVASTATIN CALCIUM 40 MG/1
40 TABLET, FILM COATED ORAL
Qty: 30 TABLET | Refills: 11 | Status: SHIPPED | OUTPATIENT
Start: 2021-11-19

## 2021-11-19 RX ADMIN — PROPOFOL 75 MCG/KG/MIN: 10 INJECTION, EMULSION INTRAVENOUS at 11:40

## 2021-11-19 RX ADMIN — MIDAZOLAM HYDROCHLORIDE 1 MG: 1 INJECTION, SOLUTION INTRAMUSCULAR; INTRAVENOUS at 11:35

## 2021-11-19 RX ADMIN — ONDANSETRON HYDROCHLORIDE 4 MG: 2 INJECTION, SOLUTION INTRAMUSCULAR; INTRAVENOUS at 11:51

## 2021-11-19 RX ADMIN — MIDAZOLAM HYDROCHLORIDE 1 MG: 1 INJECTION, SOLUTION INTRAMUSCULAR; INTRAVENOUS at 11:39

## 2021-11-19 RX ADMIN — PROPOFOL 25 MG: 10 INJECTION, EMULSION INTRAVENOUS at 11:46

## 2021-11-19 RX ADMIN — DEXMEDETOMIDINE HYDROCHLORIDE 10 MCG: 100 INJECTION, SOLUTION, CONCENTRATE INTRAVENOUS at 11:34

## 2021-11-19 RX ADMIN — CLOPIDOGREL BISULFATE 75 MG: 75 TABLET ORAL at 09:23

## 2021-11-19 RX ADMIN — PROPOFOL 25 MG: 10 INJECTION, EMULSION INTRAVENOUS at 11:43

## 2021-11-19 RX ADMIN — PHENYLEPHRINE HYDROCHLORIDE 40 MCG/MIN: 10 INJECTION INTRAVENOUS at 12:05

## 2021-11-19 RX ADMIN — ASPIRIN 81 MG: 81 TABLET, CHEWABLE ORAL at 09:23

## 2021-11-19 RX ADMIN — PROPOFOL 25 MG: 10 INJECTION, EMULSION INTRAVENOUS at 11:37

## 2021-11-19 RX ADMIN — CLOPIDOGREL BISULFATE 225 MG: 75 TABLET ORAL at 13:07

## 2021-11-19 RX ADMIN — PROPOFOL 25 MG: 10 INJECTION, EMULSION INTRAVENOUS at 11:39

## 2021-11-19 RX ADMIN — DEXMEDETOMIDINE HYDROCHLORIDE 10 MCG: 100 INJECTION, SOLUTION, CONCENTRATE INTRAVENOUS at 11:44

## 2021-11-19 RX ADMIN — GLYCOPYRROLATE 0.2 MG: 0.2 INJECTION, SOLUTION INTRAMUSCULAR; INTRAVENOUS at 12:14

## 2021-11-19 RX ADMIN — CARVEDILOL 3.12 MG: 3.12 TABLET, FILM COATED ORAL at 09:23

## 2021-11-19 RX ADMIN — FENTANYL CITRATE 50 MCG: 50 INJECTION, SOLUTION INTRAMUSCULAR; INTRAVENOUS at 11:42

## 2021-11-19 RX ADMIN — PHENYLEPHRINE HYDROCHLORIDE 60 MCG/MIN: 10 INJECTION INTRAVENOUS at 12:08

## 2021-11-19 RX ADMIN — CARVEDILOL 3.12 MG: 3.12 TABLET, FILM COATED ORAL at 17:54

## 2021-11-19 RX ADMIN — SODIUM CHLORIDE, POTASSIUM CHLORIDE, SODIUM LACTATE AND CALCIUM CHLORIDE: 600; 310; 30; 20 INJECTION, SOLUTION INTRAVENOUS at 11:34

## 2021-11-19 RX ADMIN — FENTANYL CITRATE 50 MCG: 50 INJECTION, SOLUTION INTRAMUSCULAR; INTRAVENOUS at 11:46

## 2021-11-19 NOTE — PROGRESS NOTES
Late entry 11/18: CM spoke with wife to provide update on discharge plan. Pt's wife requesting a LTSS assessment to be completed for Pt for personal care aides. CM attempted to educate Pt's wife on LTSS assessment as Pt may not be appropriate/or qualify for personal care aide services at this time due to Pt being fully mobile and ambulatory and ability to perform ADLs. Pt's wife continues to request LTSS assessment. CM to complete LTSS assessment. CM also discussed with wife of the plan to provide Pt with substance abuse resources and arranging appointment with Mook Griffin.              Tin Peña CM [employer]

## 2021-11-19 NOTE — PROGRESS NOTES
Comprehensive Nutrition Assessment    Type and Reason for Visit: Initial, RD nutrition re-screen/LOS    Nutrition Recommendations/Plan:   Continue cardiac diet  Ensure enlive BID  Please document % meals and supplements consumed in flowsheet I/O's under intake     Need updated scale weight - current weight pt stated at 100kg    Nutrition Assessment:      Chart reviewed for LOS. Pt noted for NSTEMI, substance abuse (heroine and cocaine), dyslipidemia. S/p left heart cath earlier today. Pt reports a poor appetite for a day or two when he was first admitted with N/V. His appetite is doing better and he reports eating three trays after his procedure. Pt interested in supplements to help with satiety while in house, will add Ensure BID. Current weight is pt stated, will order updated scale weight. Pt declined nutrition education at this time, stating cardiac rehab had just dropped off a folder of info. Pt noted anticipated d/c tomorrow. Will continue monitoring if he is still here after the weekend. Patient Vitals for the past 168 hrs:   % Diet Eaten   11/13/21 1304 76 - 100%   11/13/21 1100 0%   11/13/21 0700 0%     Wt Readings from Last 5 Encounters:   11/14/21 100 kg (220 lb 7.4 oz)   11/12/21 100 kg (220 lb 7.4 oz)   10/15/21 97.5 kg (215 lb)   03/27/21 95.3 kg (210 lb)   01/09/21 95.3 kg (210 lb)   ]    Estimated Daily Nutrient Needs:  Energy (kcal): 2516 kcals (BMR x 1. 3AF); Weight Used for Energy Requirements: Current  Protein (g): 80-100g (0.8-1.0g/kg); Weight Used for Protein Requirements: Current  Fluid (ml/day): 2500mL; Method Used for Fluid Requirements: 1 ml/kcal      Nutrition Related Findings:  Labs reviewed. Meds: NS, lipitor, plavix. BM PTA.       Wounds:    None       Current Nutrition Therapies:  DIET ONE TIME MESSAGE  DIET ONE TIME MESSAGE  DIET ONE TIME MESSAGE  ADULT DIET Regular; Low Fat/Low Chol/High Fiber/DORY  DIET ONE TIME MESSAGE    Anthropometric Measures:  · Height:  6' 3\" (190.5 cm)  · Current Body Wt:  100 kg (220 lb 7.4 oz)   · Ideal Body Wt:  196 lbs:  112.5 %   · BMI Category: Overweight (BMI 25.0-29. 9)       Nutrition Diagnosis:   No nutrition diagnosis at this time     Nutrition Interventions:   Food and/or Nutrient Delivery: Continue current diet, Start oral nutrition supplement  Nutrition Education and Counseling: No recommendations at this time  Coordination of Nutrition Care: Continue to monitor while inpatient    Goals:  PO intake >70% meals and ONS next 4-6 days       Nutrition Monitoring and Evaluation:   Behavioral-Environmental Outcomes: None identified  Food/Nutrient Intake Outcomes: Food and nutrient intake, Supplement intake  Physical Signs/Symptoms Outcomes: Biochemical data, GI status, Weight    Discharge Planning:    Continue oral nutrition supplement, Continue current diet     Electronically signed by Nicki Darby RD on 11/19/2021 at 4:05 PM    Contact: [id number]

## 2021-11-19 NOTE — CARDIO/PULMONARY
Cardiac Rehab Note: chart review/referral     ACS, polysubstance abuse    Cardiac cath 11/16/21, aborted r/t patient movement    Cardiac cath 11/19/21 with intervention    Smoking history assessed. Patient is a current smoker. Smoking Cessation Program link has been added to the AVS.     EF 55-60%  on 11/14/21 per echo    CAD education folder, with heart heathy diet, warning signs, heart facts, catheterization brochure, and out patient cardiac rehab program provided to [facility] on 11/19/21                                              Educated using teach back method. Reviewed CAD diagnosis definition and purpose of intervention. Discussed risk factors for CAD to include the following: family history, elevated BMI, hyperlipidemia, hypertension, diabetes, and stress. Discussed Heart Healthy/Low Sodium (less than 2000 mg) diet. Reviewed the importance of medication compliance, follow up appointments with cardiologist, signs and symptoms of angina, and what to report to physician after discharge. Emphasized the value of cardiac rehab. Discussed Cardiac Rehab Program format, benefits, and encouraged enrollment to assist with risk modification and management. CP Rehab will follow up post d/c. [facility] verbalized understanding with questions answered.   Zbigniew Sarkar RN

## 2021-11-19 NOTE — PROGRESS NOTES
Hospitalist Progress Note    NAME: Ga Bull   :  1970   MRN:  [id number]       Assessment / Plan:  NSTEMI  C/w Aspirin, high intensity statin, Plavix  Coreg resumed as per Cardiology recommendation, has been 7 days from his cocaine use  HS Tropnin: elevated. LDL: 113  Cardiac cath on  showed 95% RCA stenosis, couldn't go PCI as he wouldn't stay still  For cardiac cath today with anesthesia assistance     Substance abuse  Heroin withdrawls  Positive for cocaine which he states he used 3 days PTA  Also admits to heroin which he used 2 days PTA   c/w PRN Compazine, valium, buprenorphine  Psych evaluated recommended Detox admission, however as per , his insurance wouldn't cover for inpatient detox, will have to follow up as outpatien     dyslipidemia  -Continue Lipitor             Code Status: Full  Surrogate Decision Maker: Wife     DVT Prophylaxis: Lovenox  GI Prophylaxis: not indicated     Baseline: Independent                Estimated discharge date:   Barriers: PCI on      Subjective:     Chief Complaint / Reason for Physician Visit  No further chest pain. No shortness of breath. Going for cardiac cath today. Objective:     VITALS:   Last 24hrs VS reviewed since prior progress note. Most recent are:  Patient Vitals for the past 24 hrs:   Temp Pulse Resp BP SpO2   21 1144 -- -- -- -- 99 %   21 0810 98.3 °F (36.8 °C) 62 17 132/79 99 %   21 0326 98.1 °F (36.7 °C) 66 16 125/85 93 %   21 2235 98.3 °F (36.8 °C) 72 16 116/65 95 %   21 1918 98.3 °F (36.8 °C) 76 16 127/65 98 %   21 1800 -- 78 -- 133/69 --   21 1442 98.3 °F (36.8 °C) 77 16 (!) 101/59 98 %     No intake or output data in the 24 hours ending 21 1201     I had a face to face encounter and independently examined this patient on 2021, as outlined below:  PHYSICAL EXAM:  General: Alert, cooperative, no acute distress    EENT:  EOMI. Anicteric sclerae.  MMM  Resp:  CTA bilaterally, no wheezing or rales. No accessory muscle use  CV:  Regular  rhythm,  No edema  GI:  Soft, Non distended, Non tender. +Bowel sounds  Neurologic:  Alert and oriented X 3, normal speech,   Psych:   Good insight. Not anxious nor agitated  Skin:  No rashes. No jaundice    Reviewed most current lab test results and cultures  YES  Reviewed most current radiology test results   YES  Review and summation of old records today    NO  Reviewed patient's current orders and MAR    YES  PMH/SH reviewed - no change compared to H&P  ________________________________________________________________________  Care Plan discussed with:    Comments   Patient y    Family      RN y    Care Manager     Consultant                        Multidiciplinary team rounds were held today with , nursing, pharmacist and clinical coordinator. Patient's plan of care was discussed; medications were reviewed and discharge planning was addressed. ________________________________________________________________________  Total NON critical care TIME:  37  Minutes    Total CRITICAL CARE TIME Spent:   Minutes non procedure based      Comments   >50% of visit spent in counseling and coordination of care     ________________________________________________________________________  Brian Ponce MD     Procedures: see electronic medical records for all procedures/Xrays and details which were not copied into this note but were reviewed prior to creation of Plan. LABS:  I reviewed today's most current labs and imaging studies.   Pertinent labs include:  Recent Labs     11/17/21  0125   WBC 7.4   HGB 13.5   HCT 42.0        Recent Labs     11/17/21  0125      K 3.6      CO2 22   *   BUN 12   CREA 0.77   CA 10.2*       Signed: Brian Ponce MD

## 2021-11-19 NOTE — ANESTHESIA POSTPROCEDURE EVALUATION
Procedure(s):  LEFT HEART CATH / CORONARY ANGIOGRAPHY  ULTRASOUND GUIDED VASCULAR ACCESS  INSERT STENT EDMUNDO CORONARY  ANGIOPLASTY CORONARY.     MAC    Anesthesia Post Evaluation      Multimodal analgesia: multimodal analgesia used between 6 hours prior to anesthesia start to PACU discharge  Patient location during evaluation: PACU  Patient participation: complete - patient participated  Level of consciousness: awake and alert  Pain management: adequate  Airway patency: patent  Anesthetic complications: no  Cardiovascular status: acceptable, hemodynamically stable and blood pressure returned to baseline  Respiratory status: acceptable and room air  Hydration status: euvolemic  Post anesthesia nausea and vomiting:  none  Final Post Anesthesia Temperature Assessment:  Normothermia (36.0-37.5 degrees C)      INITIAL Post-op Vital signs:   Vitals Value Taken Time   /89 11/19/21 1323   Temp     Pulse 57 11/19/21 1323   Resp 21 11/19/21 1323   SpO2 96 % 11/19/21 1323

## 2021-11-19 NOTE — ANESTHESIA PREPROCEDURE EVALUATION
Relevant Problems   No relevant active problems       Anesthetic History   No history of anesthetic complications            Review of Systems / Medical History  Patient summary reviewed and nursing notes reviewed    Pulmonary          Smoker (1/2 ppd x 20 years)    Pertinent negatives: No COPD and asthma     Neuro/Psych           Pertinent negatives: No CVA   Cardiovascular              CAD and cardiac stents (2019)    Exercise tolerance: >4 METS  Comments: TTE (11/2021):  LV: Estimated LVEF is 55 - 60%. Biplane method used to measure ejection fraction. Normal cavity size, systolic function (ejection fraction normal) and diastolic function. Mild concentric hypertrophy. GI/Hepatic/Renal             Pertinent negatives: No renal disease   Endo/Other          Pertinent negatives: No diabetes and obesity   Other Findings            Physical Exam    Airway  Mallampati: II  TM Distance: > 6 cm  Neck ROM: normal range of motion   Mouth opening: Normal     Cardiovascular  Regular rate and rhythm,  S1 and S2 normal,  no murmur, click, rub, or gallop  Rhythm: regular  Rate: normal         Dental         Pulmonary  Breath sounds clear to auscultation               Abdominal  GI exam deferred       Other Findings            Anesthetic Plan    ASA: 4  Anesthesia type: MAC          Induction: Intravenous  Anesthetic plan and risks discussed with: Patient      Light sedation.  MAP goal > 75

## 2021-11-19 NOTE — PROGRESS NOTES
[address] [place] [address]  [phone]      Cardiology Progress Note      11/19/2021 2:03 PM    Admit Date: 11/12/2021    Admit Diagnosis:   ACS (acute coronary syndrome) ([facility] [I24.9]    Interval History/Subjective:     Sandy Manzano is a 45 y.o. male admitted for ACS (acute coronary syndrome) ([facility] [I24.9]    -VSS  -no weight  -s/p cardiac cath with anesthesia  -MR. Dar Fletcher is feeling well. He denies pain or SOB     Visit Vitals  /89 (BP 1 Location: Left upper arm, BP Patient Position: Supine)   Pulse (!) 57   Temp 98.3 °F (36.8 °C)   Resp 21   Ht 6' 3\" (1.905 m)   Wt 100 kg (220 lb 7.4 oz)   SpO2 96%   BMI 27.56 kg/m²       Current Facility-Administered Medications   Medication Dose Route Frequency    0.9% sodium chloride infusion  75 mL/hr IntraVENous CONTINUOUS    clopidogreL (PLAVIX) tablet 75 mg  75 mg Oral DAILY    carvediloL (COREG) tablet 3.125 mg  3.125 mg Oral BID WITH MEALS    LORazepam (ATIVAN) injection 1 mg  1 mg IntraVENous Q4H PRN    promethazine (PHENERGAN) tablet 25 mg  25 mg Oral Q6H PRN    buPRENORphine (BUPRENEX) injection 0.3 mg  0.3 mg IntraVENous Q6H PRN    prochlorperazine (COMPAZINE) with saline injection 10 mg  10 mg IntraVENous Q6H PRN    sodium chloride (NS) flush 5-40 mL  5-40 mL IntraVENous Q8H    sodium chloride (NS) flush 5-40 mL  5-40 mL IntraVENous PRN    nitroglycerin (NITROSTAT) tablet 0.4 mg  0.4 mg SubLINGual Q5MIN PRN    atorvastatin (LIPITOR) tablet 40 mg  40 mg Oral QHS    aspirin chewable tablet 81 mg  81 mg Oral DAILY       Objective:      Physical Exam:  General Appearance:  pleasant, adult, AAM resting in bed in NAD  Chest:   Clear  Cardiovascular:  Regular rate and rhythm, no murmur. Abdomen:   Soft, non-tender, bowel sounds are active. Extremities: palpable distal pulses; no edema  Skin:  Warm and dry.  R radial site CDI without swelling or bleeding     Data Review:   Recent Labs     11/17/21  0125   WBC 7.4 HGB 13.5   HCT 42.0        Recent Labs     11/17/21  0125      K 3.6      CO2 22   *   BUN 12   CREA 0.77   CA 10.2*       No results for input(s): TROIQ, CPK, CKMB in the last 72 hours. Intake/Output Summary (Last 24 hours) at 11/19/2021 1403  Last data filed at 11/19/2021 1250  Gross per 24 hour   Intake 650 ml   Output 25 ml   Net 625 ml        Telemetry: SR with PVCs  EKG:  ECHO:  EF 55-60%  Cxray: no acute process     Assessment:     Principal Problem:    ACS (acute coronary syndrome) ([facility] (11/12/2021)    Active Problems:    Polysubstance abuse ([facility] (11/13/2021)      Noncompliance with medication regimen (11/13/2021)      CAD (coronary artery disease), native coronary artery (11/13/2021)      S/P PTCA (percutaneous transluminal coronary angioplasty) (11/13/2021)      Mixed hyperlipidemia (11/13/2021)      S/P cardiac cath (11/19/2021)      Overview: 11/19/21: EDMUNDO to mRCA and dRCA        Plan:     NSTEMI/CAD: In setting of recent cocaine use. · S/p repeat cardiac cath with anesthesia with successful stenting of mid and distal RCA  · Continue ASA, plavix, BB. Loaded with plavix  ·  on drug cessation       Drug abuse:  Cocaine/ heroin  ·  on cessation  · Any withdrawal per hospitalist       HLD:    · Statin        Patient should be ready for discharge tomorrow morning      Kelle Mars NP  DNP, RN, AGAP-BC    Patient seen and examined by me with the above nurse practitioner. I personally performed all components of the history, physical, and medical decision making and agree with the assessment and plan with minor modifications as noted. Today the patient is feeling well s/p EDMUNDO times 2 to RCA. General PE  Gen:  NAD  Mental Status - Alert. General Appearance - Not in acute distress. HEENT:  PERRL, no carotid bruits or JVD  Chest and Lung Exam   Inspection: Accessory muscles - No use of accessory muscles in breathing.    Auscultation:   Breath sounds: - Normal.   Cardiovascular   Inspection: Jugular vein - Bilateral - Inspection Normal.   Palpation/Percussion:   Apical Impulse: - Normal.   Auscultation: Rhythm - Regular. Heart Sounds - S1 WNL and S2 WNL. No S3 or S4. Murmurs & Other Heart Sounds: Auscultation of the heart reveals - No Murmurs. Peripheral Vascular   Upper Extremity: Inspection - Bilateral - No Cyanotic nailbeds or Digital clubbing. Lower Extremity:   Palpation: Edema - Bilateral - No edema. Abdomen:   Soft, non-tender, bowel sounds are active. Neuro: A&O times 3, CN and motor grossly WNL    OK for DC in AM if no issues. Critical importance of DAPT uninterrupted and drug cessation counseled at length.   Follow up in 2 weeks with Ileana Church NP.

## 2021-11-19 NOTE — ROUTINE PROCESS
0718- Report received per Zach Parisi. Denies CP SOB. Awaiting cath. 1330-Pt back from cath lab. SB on monitor. VSS. Denies CP SOB. TR band at 11, CDI. 1730- Right radial cath site CDI.  VSS. Ambulated in hallways. Denies CP SOB. SR on monitor. 1810- Pt requested to be discharged to home . Pt spoke with Dr Parish Beckman, aware that he is advised to stay for observation. Pt requesting to be discharged AMA. Discharge instructions reviewed with pt. Pt verbalized understanding. Pt left with uncle. PIV removed. [address] with Kendy Nieves to update that Dr Parish Beckman did not sign AMA form. Form placed in medical records bin.

## 2021-11-22 LAB — ACT BLD: 367 SECS (ref 79–138)

## 2021-12-02 NOTE — DISCHARGE SUMMARY
Hospitalist Discharge Summary     Patient ID:  Tabitha Aguilar  [id number]  86 y.o.  1970 11/12/2021    PCP on record: Georgina Altamirano MD    Admit date: 11/12/2021  Discharge date and time: 11/19/2021    DISCHARGE DIAGNOSIS:    Patient left AGAINST MEDICAL ADVICE  Acute non-STEMI s/p cardiac cath with stents  Substance abuse  Heroin withdrawal    CONSULTATIONS:  IP CONSULT TO CARDIOLOGY  IP CONSULT TO PSYCHIATRY    Excerpted HPI from H&P of Guera Philippe MD:  Tabitha Aguilar is a 45 y.o. male with a past medical history significant for CAD who presented with a 1 day history of chest pain. Patient states he was moving boxes with his grandkids this morning. He then developed substernal chest pain that he described as a pressure. He then had associated shortness of breath and some sweating. He came to the ED for further evaluation. He does have a history of CAD status post stent placement. However, he stopped taking his medications a few months ago because he said one of them did not make him feel right. He also recently used cocaine and heroin in the past 3 days. At the time my examination, he currently has no complaints. In the ED, he was given heparin drip and a nitro drip which resolved his symptoms. He denies any fevers or chills. No nausea vomiting or diarrhea.       ______________________________________________________________________  DISCHARGE SUMMARY/HOSPITAL COURSE:  for full details see H&P, daily progress notes, labs, consult notes. NSTEMI  C/w Aspirin, high intensity statin, Plavix  Coreg resumed as per Cardiology recommendation, has been 7 days from his cocaine use  HS Tropnin: elevated.   LDL: 113  Cardiac cath on 11/16 showed 95% RCA stenosis, couldn't go PCI as he wouldn't stay still  S/p cardiac cath with anesthesia with successful stenting  Patient unfortunately decided to leave [address] did not want to stay in the hospital.  I notified him that he is putting himself in great danger if he leaves [address] including death and also denial of hospital payment from insurance company but in spite of that he wanted to leave [address] I clearly told him the risks as above but in spite of that he chose to leave [address]     Substance abuse  Heroin withdrawls  Positive for cocaine which he states he used 3 days PTA  Also admits to heroin which he used 2 days PTA   c/w PRN Compazine, valium, buprenorphine  Psych evaluated recommended Detox admission, however as per CM, his insurance wouldn't cover for inpatient detox, will have to follow up as outpatien      dyslipidemia  -Continue Lipitor        _______________________________________________________________________  Patient seen and examined by me on discharge day. Pertinent Findings:  Gen:    Not in distress  Chest: Clear lungs  CVS:   Regular rhythm. No edema  Abd:  Soft, not distended, not tender  Neuro:  Alert, awake  _______________________________________________________________________  DISCHARGE MEDICATIONS:   Discharge Medication List as of 11/19/2021  5:55 PM      START taking these medications    Details   !! carvediloL (COREG) 3.125 mg tablet Take 1 Tablet by mouth two (2) times daily (with meals). , Normal, Disp-60 Tablet, R-11      aspirin 81 mg chewable tablet Take 1 Tablet by mouth daily. , Normal, Disp-30 Tablet, R-11      atorvastatin (LIPITOR) 40 mg tablet Take 1 Tablet by mouth nightly., Normal, Disp-30 Tablet, R-11      clopidogreL (PLAVIX) 75 mg tab Take 1 Tablet by mouth daily. , Normal, Disp-30 Tablet, R-11       !! - Potential duplicate medications found. Please discuss with provider. CONTINUE these medications which have NOT CHANGED    Details   clindamycin (CLEOCIN) 300 mg capsule Take 1 Capsule by mouth two (2) times a day., Normal, Disp-14 Capsule, R-0      diclofenac EC (VOLTAREN) 75 mg EC tablet Take 1 Tab by mouth two (2) times a day. , Normal, Disp-14 Tab, R-0      nitroglycerin (NITROSTAT) 0.4 mg SL tablet DISSOLVE 1 TABLET UNDER THE TONGUE AS NEEDED FOR CHEST PAIN, MAY REPEAT EVERY 5 MINUTES FOR 3 TOTAL DOSES., Historical Med, R-3      !! carvedilol (COREG) 3.125 mg tablet TAKE ONE TABLET BY MOUTH EVERY 12 HOURS, Historical Med, R-11       !! - Potential duplicate medications found. Please discuss with provider. STOP taking these medications       metoprolol tartrate (LOPRESSOR) 25 mg tablet Comments:   Reason for Stopping:                 Patient Follow Up Instructions:   Patient left AGAINST MEDICAL ADVICE    Follow-up Information     Follow up With Specialties Details Why Contact Info    Everett Hudson  [facility], [facility]iology, Interventional Cardiology On 12/1/2021 at 1:30PM at [address] [facility]  [phone]      [place]  Go on 11/22/2021 at 1:30pm. New patient appointment for substance abuse treatment.  Please bring ID and insurance card [address] [place], [place] [address] [facility],[address], [address] [place] [address] [address] [facility]  [phone]    Primary Care follow-up   Schedule an appointment as soon as possible for a visit in 1 week Please arrange a primary care provider to follow-up for continued care      Rut Bee  [facility], Nurse Practitioner, Cardiology Schedule an appointment as soon as possible for a visit  [facility]  [phone]          ________________________________________________________________    Risk of deterioration: High    Condition at Discharge:  Stable  __________________________________________________________________    Disposition  Patient left AGAINST MEDICAL ADVICE    ____________________________________________________________________    Code Status: Full Code  ___________________________________________________________________      Total time in minutes spent coordinating this discharge (includes going over instructions, follow-up, prescriptions, and preparing report for sign off to her PCP) :  35  minutes    Signed:  Karol Valle MD

## 2022-03-19 PROBLEM — E78.2 MIXED HYPERLIPIDEMIA: Status: ACTIVE | Noted: 2021-11-13

## 2022-03-19 PROBLEM — Z98.890 S/P CARDIAC CATH: Status: ACTIVE | Noted: 2021-11-19

## 2022-03-19 PROBLEM — I24.9 ACS (ACUTE CORONARY SYNDROME) (HCC): Status: ACTIVE | Noted: 2021-11-12

## 2022-03-19 PROBLEM — I25.10 CAD (CORONARY ARTERY DISEASE), NATIVE CORONARY ARTERY: Status: ACTIVE | Noted: 2021-11-13

## 2022-03-19 PROBLEM — F19.10 POLYSUBSTANCE ABUSE (HCC): Status: ACTIVE | Noted: 2021-11-13

## 2022-03-20 PROBLEM — Z98.61 S/P PTCA (PERCUTANEOUS TRANSLUMINAL CORONARY ANGIOPLASTY): Status: ACTIVE | Noted: 2021-11-13

## 2022-03-20 PROBLEM — Z91.148 NONCOMPLIANCE WITH MEDICATION REGIMEN: Status: ACTIVE | Noted: 2021-11-13

## 2023-10-04 ENCOUNTER — HOSPITAL ENCOUNTER (EMERGENCY)
Facility: HOSPITAL | Age: 53
Discharge: HOME | End: 2023-10-04

## 2023-10-04 VITALS
TEMPERATURE: 97.5 F | WEIGHT: 240 LBS | OXYGEN SATURATION: 100 % | BODY MASS INDEX: 29.84 KG/M2 | HEIGHT: 75 IN | HEART RATE: 66 BPM | DIASTOLIC BLOOD PRESSURE: 93 MMHG | SYSTOLIC BLOOD PRESSURE: 150 MMHG | RESPIRATION RATE: 18 BRPM

## 2023-10-04 DIAGNOSIS — M54.32 SCIATICA OF LEFT SIDE: Primary | ICD-10-CM

## 2023-10-04 PROCEDURE — 99284 EMERGENCY DEPT VISIT MOD MDM: CPT

## 2023-10-04 PROCEDURE — 6360000002 HC RX W HCPCS: Performed by: PHYSICIAN ASSISTANT

## 2023-10-04 PROCEDURE — 96372 THER/PROPH/DIAG INJ SC/IM: CPT

## 2023-10-04 RX ORDER — KETOROLAC TROMETHAMINE 30 MG/ML
30 INJECTION, SOLUTION INTRAMUSCULAR; INTRAVENOUS ONCE
Status: COMPLETED | OUTPATIENT
Start: 2023-10-04 | End: 2023-10-04

## 2023-10-04 RX ORDER — PREDNISONE 10 MG/1
TABLET ORAL
Qty: 21 EACH | Refills: 0 | Status: SHIPPED | OUTPATIENT
Start: 2023-10-04

## 2023-10-04 RX ORDER — TIZANIDINE 2 MG/1
2 TABLET ORAL 3 TIMES DAILY PRN
Qty: 9 TABLET | Refills: 0 | Status: SHIPPED | OUTPATIENT
Start: 2023-10-04 | End: 2023-10-07

## 2023-10-04 RX ORDER — ACETAMINOPHEN 500 MG
1000 TABLET ORAL EVERY 6 HOURS PRN
Qty: 30 TABLET | Refills: 0 | Status: SHIPPED | OUTPATIENT
Start: 2023-10-04

## 2023-10-04 RX ADMIN — KETOROLAC TROMETHAMINE 30 MG: 30 INJECTION, SOLUTION INTRAMUSCULAR; INTRAVENOUS at 17:37

## 2023-10-04 ASSESSMENT — PAIN DESCRIPTION - ORIENTATION: ORIENTATION: LEFT

## 2023-10-04 ASSESSMENT — PAIN DESCRIPTION - DESCRIPTORS: DESCRIPTORS: SHARP

## 2023-10-04 ASSESSMENT — PAIN - FUNCTIONAL ASSESSMENT: PAIN_FUNCTIONAL_ASSESSMENT: 0-10

## 2023-10-04 ASSESSMENT — PAIN SCALES - GENERAL: PAINLEVEL_OUTOF10: 10

## 2023-10-04 ASSESSMENT — PAIN DESCRIPTION - LOCATION: LOCATION: LEG

## 2023-10-04 NOTE — ED NOTES

## 2024-05-23 ENCOUNTER — HOSPITAL ENCOUNTER (OUTPATIENT)
Facility: HOSPITAL | Age: 54
Discharge: HOME | End: 2024-05-23
Payer: COMMERCIAL

## 2024-05-23 ENCOUNTER — TRANSCRIBE ORDERS (OUTPATIENT)
Facility: HOSPITAL | Age: 54
End: 2024-05-23

## 2024-05-23 DIAGNOSIS — M25.551 RIGHT HIP PAIN: Primary | ICD-10-CM

## 2024-05-23 DIAGNOSIS — M54.89 OTHER DORSALGIA: ICD-10-CM

## 2024-05-23 DIAGNOSIS — M25.552 LEFT HIP PAIN: ICD-10-CM

## 2024-05-23 DIAGNOSIS — M25.551 RIGHT HIP PAIN: ICD-10-CM

## 2024-05-23 PROCEDURE — 73521 X-RAY EXAM HIPS BI 2 VIEWS: CPT

## 2024-05-23 PROCEDURE — 72110 X-RAY EXAM L-2 SPINE 4/>VWS: CPT

## 2024-05-31 ENCOUNTER — HOSPITAL ENCOUNTER (EMERGENCY)
Facility: HOSPITAL | Age: 54
Discharge: LEFT BEFORE DISCHARGE | End: 2024-06-01
Attending: EMERGENCY MEDICINE
Payer: COMMERCIAL

## 2024-05-31 DIAGNOSIS — R07.9 ACUTE CHEST PAIN: Primary | ICD-10-CM

## 2024-05-31 DIAGNOSIS — I16.0 HYPERTENSIVE URGENCY: ICD-10-CM

## 2024-05-31 DIAGNOSIS — K21.9 GASTROESOPHAGEAL REFLUX DISEASE WITHOUT ESOPHAGITIS: ICD-10-CM

## 2024-05-31 DIAGNOSIS — R07.89 ATYPICAL CHEST PAIN: ICD-10-CM

## 2024-05-31 DIAGNOSIS — Z53.21 ELOPED FROM EMERGENCY DEPARTMENT: ICD-10-CM

## 2024-05-31 LAB
COMMENT:: NORMAL
SPECIMEN HOLD: NORMAL

## 2024-05-31 PROCEDURE — 99285 EMERGENCY DEPT VISIT HI MDM: CPT

## 2024-05-31 PROCEDURE — 84484 ASSAY OF TROPONIN QUANT: CPT

## 2024-05-31 PROCEDURE — 93005 ELECTROCARDIOGRAM TRACING: CPT | Performed by: EMERGENCY MEDICINE

## 2024-05-31 PROCEDURE — 83880 ASSAY OF NATRIURETIC PEPTIDE: CPT

## 2024-05-31 PROCEDURE — 80053 COMPREHEN METABOLIC PANEL: CPT

## 2024-05-31 PROCEDURE — 83690 ASSAY OF LIPASE: CPT

## 2024-05-31 PROCEDURE — 96374 THER/PROPH/DIAG INJ IV PUSH: CPT

## 2024-05-31 PROCEDURE — 85025 COMPLETE CBC W/AUTO DIFF WBC: CPT

## 2024-05-31 ASSESSMENT — LIFESTYLE VARIABLES
HOW MANY STANDARD DRINKS CONTAINING ALCOHOL DO YOU HAVE ON A TYPICAL DAY: PATIENT DOES NOT DRINK
HOW OFTEN DO YOU HAVE A DRINK CONTAINING ALCOHOL: NEVER

## 2024-06-01 ENCOUNTER — APPOINTMENT (OUTPATIENT)
Facility: HOSPITAL | Age: 54
End: 2024-06-01
Payer: COMMERCIAL

## 2024-06-01 VITALS
RESPIRATION RATE: 29 BRPM | TEMPERATURE: 98.7 F | BODY MASS INDEX: 28.6 KG/M2 | DIASTOLIC BLOOD PRESSURE: 100 MMHG | HEIGHT: 75 IN | OXYGEN SATURATION: 98 % | WEIGHT: 230 LBS | HEART RATE: 69 BPM | SYSTOLIC BLOOD PRESSURE: 151 MMHG

## 2024-06-01 LAB
ALBUMIN SERPL-MCNC: 3.4 G/DL (ref 3.5–5)
ALBUMIN/GLOB SERPL: 0.9 (ref 1.1–2.2)
ALP SERPL-CCNC: 90 U/L (ref 45–117)
ALT SERPL-CCNC: 53 U/L (ref 12–78)
ANION GAP SERPL CALC-SCNC: 10 MMOL/L (ref 5–15)
AST SERPL-CCNC: 43 U/L (ref 15–37)
BASOPHILS # BLD: 0.1 K/UL (ref 0–0.1)
BASOPHILS NFR BLD: 1 % (ref 0–1)
BILIRUB SERPL-MCNC: 0.3 MG/DL (ref 0.2–1)
BUN SERPL-MCNC: 10 MG/DL (ref 6–20)
BUN/CREAT SERPL: 11 (ref 12–20)
CALCIUM SERPL-MCNC: 9.4 MG/DL (ref 8.5–10.1)
CHLORIDE SERPL-SCNC: 107 MMOL/L (ref 97–108)
CO2 SERPL-SCNC: 27 MMOL/L (ref 21–32)
CREAT SERPL-MCNC: 0.88 MG/DL (ref 0.7–1.3)
DIFFERENTIAL METHOD BLD: ABNORMAL
EOSINOPHIL # BLD: 0.4 K/UL (ref 0–0.4)
EOSINOPHIL NFR BLD: 6 % (ref 0–7)
ERYTHROCYTE [DISTWIDTH] IN BLOOD BY AUTOMATED COUNT: 14.6 % (ref 11.5–14.5)
GLOBULIN SER CALC-MCNC: 3.7 G/DL (ref 2–4)
GLUCOSE SERPL-MCNC: 101 MG/DL (ref 65–100)
HCT VFR BLD AUTO: 36.1 % (ref 36.6–50.3)
HGB BLD-MCNC: 11.8 G/DL (ref 12.1–17)
IMM GRANULOCYTES # BLD AUTO: 0 K/UL (ref 0–0.04)
IMM GRANULOCYTES NFR BLD AUTO: 0 % (ref 0–0.5)
LIPASE SERPL-CCNC: 50 U/L (ref 13–75)
LYMPHOCYTES # BLD: 2.6 K/UL (ref 0.8–3.5)
LYMPHOCYTES NFR BLD: 40 % (ref 12–49)
MCH RBC QN AUTO: 23.9 PG (ref 26–34)
MCHC RBC AUTO-ENTMCNC: 32.7 G/DL (ref 30–36.5)
MCV RBC AUTO: 73.2 FL (ref 80–99)
MONOCYTES # BLD: 0.6 K/UL (ref 0–1)
MONOCYTES NFR BLD: 9 % (ref 5–13)
NEUTS SEG # BLD: 2.9 K/UL (ref 1.8–8)
NEUTS SEG NFR BLD: 44 % (ref 32–75)
NRBC # BLD: 0 K/UL (ref 0–0.01)
NRBC BLD-RTO: 0 PER 100 WBC
NT PRO BNP: 53 PG/ML (ref 0–125)
PLATELET # BLD AUTO: 199 K/UL (ref 150–400)
PMV BLD AUTO: 11.5 FL (ref 8.9–12.9)
POTASSIUM SERPL-SCNC: 3.4 MMOL/L (ref 3.5–5.1)
PROT SERPL-MCNC: 7.1 G/DL (ref 6.4–8.2)
RBC # BLD AUTO: 4.93 M/UL (ref 4.1–5.7)
SODIUM SERPL-SCNC: 144 MMOL/L (ref 136–145)
TROPONIN I SERPL HS-MCNC: 12 NG/L (ref 0–76)
WBC # BLD AUTO: 6.6 K/UL (ref 4.1–11.1)

## 2024-06-01 PROCEDURE — A4216 STERILE WATER/SALINE, 10 ML: HCPCS | Performed by: EMERGENCY MEDICINE

## 2024-06-01 PROCEDURE — 2580000003 HC RX 258: Performed by: EMERGENCY MEDICINE

## 2024-06-01 PROCEDURE — 2500000003 HC RX 250 WO HCPCS: Performed by: EMERGENCY MEDICINE

## 2024-06-01 PROCEDURE — 71045 X-RAY EXAM CHEST 1 VIEW: CPT

## 2024-06-01 RX ORDER — POTASSIUM CHLORIDE 750 MG/1
40 TABLET, FILM COATED, EXTENDED RELEASE ORAL ONCE
Status: DISCONTINUED | OUTPATIENT
Start: 2024-06-01 | End: 2024-06-01 | Stop reason: HOSPADM

## 2024-06-01 RX ORDER — FAMOTIDINE 20 MG/1
20 TABLET, FILM COATED ORAL 2 TIMES DAILY
Qty: 60 TABLET | Refills: 0 | Status: SHIPPED | OUTPATIENT
Start: 2024-06-01

## 2024-06-01 RX ADMIN — FAMOTIDINE 20 MG: 10 INJECTION, SOLUTION INTRAVENOUS at 00:03

## 2024-06-01 ASSESSMENT — ENCOUNTER SYMPTOMS
DIARRHEA: 0
EYE PAIN: 0
SORE THROAT: 0
ABDOMINAL PAIN: 0
COUGH: 0
RHINORRHEA: 0
SHORTNESS OF BREATH: 0
VOMITING: 0
NAUSEA: 0

## 2024-06-01 NOTE — ED PROVIDER NOTES
EMERGENCY DEPARTMENT HISTORY AND PHYSICAL EXAM            Please note that this dictation was completed with the assistance of \"Dragon\", the computer voice recognition software. Quite often unanticipated grammatical, syntax, homophones, and other interpretive errors are inadvertently transcribed by the computer software. Please disregard these errors and any errors that have escaped final proofreading. Thank you.    Date of Evaluation: 06/01/24  Patient: Phoenix Nieto  Patient Age and Sex: 54 y.o. male   MRN: [id number]  CSN: [id number]  PCP: No primary care provider on file.    History of Present Illness     Chief Complaint   Patient presents with    Chest Pain     ED visit d/t chest pain, central substernal / upper abd pain - onset of sxs, 20 mins PTA - reports eating a frozen pot pie prior to sxs;;      History Provided By: Patient/family/EMS (if available)    History is limited by: Nothing     HPI: Phoenix Nieto, 54 y.o. male with past medical history as documented below presents to the ED with c/o of sudden onset of mild to moderate substernal burning epigastric pain and substernal chest wall pain onset about 20 minutes ago.  Patient reports eating a frozen pot pie prior to symptoms.  Does have known history of CAD and multiple stents.  Patient states that pain does not feel like his previous heart attack.  Patient has been compliant with his cardiac medications.  Does take his baby aspirin today.  No recent prolonged travel, history of DVT or PE.. Pt denies any other exacerbating or ameliorating factors. There are no other complaints, changes or physical findings pertinent to the HPI at this time.    Nursing notes were all reviewed and agreed with or any disagreements were addressed in the HPI.    Past History   Past Medical History:  Past Medical History:   Diagnosis Date    Ill-defined condition     heart attack    Other ill-defined conditions(709.89)     hx of back pain       Past Surgical  laboratory results: See below in ED Course section.    Amount and/or Complexity of Data Reviewed  HIGH complexity decision making performed   Presentation: ACUTE and SEVERE (giving consideration to thing such as systemic symptoms, impact on quality of life, morbidity and mortality).  Clinical lab tests: ordered as appropriate, reviewed and interpreted by me  Tests in the radiology section of CPT®: ordered as appropriate, reviewed and interpreted by me   Tests in the medicine section of CPT®: ordered as appropriate, reviewed and interpreted by me   Review and summarize past medical records: yes    Risks  OTC drugs.  Prescription drug management.  Parenteral controlled substances.  Drug therapy requiring intensive monitoring for toxicity.  Decision regarding hospitalization.   Social determinants of health, if present addressed per documentation above     Progress Note:  I have just re-evaluated the patient. Pt reports improvement of his symptoms after ED treatment. I have reviewed his vital signs and determined there is currently no worsening in their condition or physical exam. Results have been reviewed with them and their questions have been answered. I will continue to review further results as they come available.     ED Course:  ED Course as of 06/01/24 0318   Fri May 31, 2024   2358 Reviewed chart, patient was seen at [facility] May 25, 2024 for withdrawal, nausea and vomiting. [HW]   2355 Patient's medication list includes 81 aspirin, atorvastatin, clonidine, famotidine, fluoxetine, furosemide, naloxone, Zofran. [HW]   2356 Per chart history, patient was admitted May 25, 2024 at [facility] for opioid withdrawal, patient recently started on Suboxone, also used heroin.  Patient complaining of nausea and vomiting. [HW]   2356 Reviewed cardiology notes, patient seen at [facility] cardiology January 19, 2024, noted history of CAD, history of PCI with drug-eluting stent in 2019 followed by another stent in 2021.  Stress MRI done

## 2024-06-01 NOTE — ED NOTES
Pt left dept prior to discharge. Called number on file and spoke to pts wife. She states he made it home and is asleep. He took his IV out. Provider aware.

## 2024-06-03 LAB
EKG ATRIAL RATE: 83 BPM
EKG DIAGNOSIS: NORMAL
EKG P AXIS: 54 DEGREES
EKG P-R INTERVAL: 142 MS
EKG Q-T INTERVAL: 376 MS
EKG QRS DURATION: 90 MS
EKG QTC CALCULATION (BAZETT): 441 MS
EKG R AXIS: 7 DEGREES
EKG T AXIS: 62 DEGREES
EKG VENTRICULAR RATE: 83 BPM

## (undated) DEVICE — HI-TORQUE VERSATURN F GUIDE WIRE FULLY COATED .014 STRAIGHT TIP 190 CM: Brand: HI-TORQUE VERSATURN

## (undated) DEVICE — RADIFOCUS OPTITORQUE ANGIOGRAPHIC CATHETER: Brand: OPTITORQUE

## (undated) DEVICE — Device

## (undated) DEVICE — SYR ART 700 CLEAR MARK 7 -- ARTERION

## (undated) DEVICE — TUBING PRSS MON L6IN PVC M FEM CONN

## (undated) DEVICE — CATH GUID COR AL75S 6FR 100CM -- LAUNCHER

## (undated) DEVICE — PACK PROCEDURE SURG HRT CATH

## (undated) DEVICE — KIT ANGIOGRAPHY CUST MRMC

## (undated) DEVICE — BAND COMPR L24CM REG CLR PLAS HEMSTAT EXT HK AND LOOP RETEN

## (undated) DEVICE — HEART CATH-MRMC: Brand: MEDLINE INDUSTRIES, INC.

## (undated) DEVICE — SPLINT WR POS F/ARTERIAL ACC -- BX/10

## (undated) DEVICE — CATH BLLN DIL 2.0X12MM RX -- EUPHORA

## (undated) DEVICE — GLIDESHEATH SLENDER ACCESS KIT: Brand: GLIDESHEATH SLENDER

## (undated) DEVICE — CATHETER GUID 6FR L100CM DIA0.071IN NYL SHFT 3DRC W/O SIDE

## (undated) DEVICE — HI-TORQUE VERSACORE FLOPPY GUIDE WIRE SYSTEM 145 CM: Brand: HI-TORQUE VERSACORE

## (undated) DEVICE — GUIDEWIRE VASC L260CM 0.035IN J TIP L3MM PTFE FIX COR NAMIC

## (undated) DEVICE — SYRINGE ANGIO 10 CC BRL STD PRNT POLYCARB LT BLU MEDALLION

## (undated) DEVICE — DEVICE COMPR REG 24 CM VASC BND

## (undated) DEVICE — DRAPE PRB US TRNSDCR 6X96IN --

## (undated) DEVICE — GEC TELE 6F TELESCOPE --

## (undated) DEVICE — CATH GUID COR JR4.0 6FR 100CM -- LAUNCHER

## (undated) DEVICE — ROSEN CURVED WIRE GUIDE: Brand: ROSEN

## (undated) DEVICE — 3M™ TEGADERM™ TRANSPARENT FILM DRESSING FRAME STYLE, 1626W, 4 IN X 4-3/4 IN (10 CM X 12 CM), 50/CT 4CT/CASE: Brand: 3M™ TEGADERM™

## (undated) DEVICE — CATHETER ETER ANGIO L110CM OD5FR ID046IN L75CM 038IN 145DEG CARD

## (undated) DEVICE — CUSTOM KT PTCA INFL DEV K05 00053H

## (undated) DEVICE — MEDI-TRACE CADENCE ADULT, DEFIBRILLATION ELECTRODE -RTS  (10 PR/PK) - PHILIPS: Brand: MEDI-TRACE CADENCE

## (undated) DEVICE — CATH BLLN DIL 2.25X12MM RX -- EUPHORA